# Patient Record
Sex: FEMALE | Race: WHITE | NOT HISPANIC OR LATINO | Employment: OTHER | URBAN - NONMETROPOLITAN AREA
[De-identification: names, ages, dates, MRNs, and addresses within clinical notes are randomized per-mention and may not be internally consistent; named-entity substitution may affect disease eponyms.]

---

## 2023-03-09 DIAGNOSIS — E87.6 HYPOKALEMIA: ICD-10-CM

## 2023-03-09 RX ORDER — METFORMIN HYDROCHLORIDE 500 MG/1
500 TABLET ORAL
COMMUNITY
End: 2023-03-24

## 2023-03-09 RX ORDER — GLIMEPIRIDE 2 MG/1
2 TABLET ORAL 2 TIMES DAILY
COMMUNITY
End: 2023-04-06

## 2023-03-09 RX ORDER — POTASSIUM CHLORIDE 20 MEQ/1
20 TABLET, EXTENDED RELEASE ORAL DAILY
COMMUNITY
End: 2023-03-22 | Stop reason: SDUPTHER

## 2023-03-09 RX ORDER — POTASSIUM CHLORIDE 20 MEQ/1
20 TABLET, EXTENDED RELEASE ORAL DAILY
Qty: 90 TABLET | Refills: 1 | Status: CANCELLED | OUTPATIENT
Start: 2023-03-09

## 2023-03-09 RX ORDER — AMLODIPINE BESYLATE 10 MG/1
5 TABLET ORAL DAILY
COMMUNITY
End: 2023-04-07

## 2023-03-09 RX ORDER — ENALAPRIL MALEATE 20 MG/1
20 TABLET ORAL DAILY
COMMUNITY
End: 2023-04-06

## 2023-03-09 RX ORDER — BISOPROLOL FUMARATE AND HYDROCHLOROTHIAZIDE 5; 6.25 MG/1; MG/1
1 TABLET ORAL DAILY
COMMUNITY
End: 2023-04-06

## 2023-03-09 RX ORDER — FLUTICASONE PROPIONATE 50 MCG
2 SPRAY, SUSPENSION (ML) NASAL DAILY
COMMUNITY
Start: 2019-07-02 | End: 2023-03-22

## 2023-03-18 DIAGNOSIS — E87.6 HYPOKALEMIA: Primary | ICD-10-CM

## 2023-03-18 PROBLEM — B00.89 HERPES SIMPLEX VIRUS (HSV) INFECTION OF BUTTOCK: Status: ACTIVE | Noted: 2023-03-18

## 2023-03-18 PROBLEM — N95.2 POSTMENOPAUSAL ATROPHIC VAGINITIS: Status: ACTIVE | Noted: 2023-03-18

## 2023-03-18 PROBLEM — E78.5 HYPERLIPIDEMIA: Status: ACTIVE | Noted: 2023-03-18

## 2023-03-18 PROBLEM — E66.09 CLASS 1 OBESITY DUE TO EXCESS CALORIES WITH SERIOUS COMORBIDITY AND BODY MASS INDEX (BMI) OF 30.0 TO 30.9 IN ADULT: Status: ACTIVE | Noted: 2023-03-18

## 2023-03-18 PROBLEM — I10 HYPERTENSION: Status: ACTIVE | Noted: 2023-03-18

## 2023-03-18 PROBLEM — R21 SKIN RASH: Status: ACTIVE | Noted: 2023-03-18

## 2023-03-18 PROBLEM — R32 URINARY INCONTINENCE IN FEMALE: Status: ACTIVE | Noted: 2023-03-18

## 2023-03-18 PROBLEM — E66.811 CLASS 1 OBESITY DUE TO EXCESS CALORIES WITH SERIOUS COMORBIDITY AND BODY MASS INDEX (BMI) OF 30.0 TO 30.9 IN ADULT: Status: ACTIVE | Noted: 2023-03-18

## 2023-03-18 PROBLEM — M85.89 OSTEOPENIA OF MULTIPLE SITES: Status: ACTIVE | Noted: 2023-03-18

## 2023-03-18 PROBLEM — N18.1 STAGE 1 CHRONIC KIDNEY DISEASE: Status: ACTIVE | Noted: 2023-03-18

## 2023-03-18 PROBLEM — I83.813 VARICOSE VEINS OF BOTH LOWER EXTREMITIES WITH PAIN: Status: ACTIVE | Noted: 2023-03-18

## 2023-03-18 PROBLEM — N99.3 PROLAPSE OF VAGINAL VAULT AFTER HYSTERECTOMY: Status: ACTIVE | Noted: 2023-03-18

## 2023-03-18 PROBLEM — R06.2 WHEEZING: Status: ACTIVE | Noted: 2023-03-18

## 2023-03-18 PROBLEM — J30.9 ALLERGIC RHINITIS DUE TO ALLERGEN: Status: ACTIVE | Noted: 2023-03-18

## 2023-03-18 PROBLEM — E66.09 CLASS 1 OBESITY DUE TO EXCESS CALORIES WITH SERIOUS COMORBIDITY AND BODY MASS INDEX (BMI) OF 31.0 TO 31.9 IN ADULT: Status: ACTIVE | Noted: 2023-03-18

## 2023-03-18 PROBLEM — E66.811 CLASS 1 OBESITY DUE TO EXCESS CALORIES WITH SERIOUS COMORBIDITY AND BODY MASS INDEX (BMI) OF 31.0 TO 31.9 IN ADULT: Status: ACTIVE | Noted: 2023-03-18

## 2023-03-18 PROBLEM — E11.9 TYPE 2 DIABETES MELLITUS WITHOUT COMPLICATION, WITHOUT LONG-TERM CURRENT USE OF INSULIN (MULTI): Status: ACTIVE | Noted: 2023-03-18

## 2023-03-18 PROBLEM — R53.82 CHRONIC FATIGUE: Status: ACTIVE | Noted: 2023-03-18

## 2023-03-18 RX ORDER — LANCETS 33 GAUGE
1 EACH MISCELLANEOUS 2 TIMES DAILY
COMMUNITY
Start: 2022-12-12 | End: 2023-11-22 | Stop reason: SDUPTHER

## 2023-03-18 RX ORDER — LATANOPROST 50 UG/ML
SOLUTION/ DROPS OPHTHALMIC
COMMUNITY
Start: 2013-04-02

## 2023-03-18 RX ORDER — DORZOLAMIDE HCL 20 MG/ML
SOLUTION/ DROPS OPHTHALMIC
COMMUNITY
Start: 2014-05-18 | End: 2024-02-26 | Stop reason: SDUPTHER

## 2023-03-18 RX ORDER — GARLIC 1000 MG
1 CAPSULE ORAL DAILY
COMMUNITY

## 2023-03-18 RX ORDER — BLOOD SUGAR DIAGNOSTIC
1 STRIP MISCELLANEOUS 3 TIMES DAILY
COMMUNITY
Start: 2013-04-02 | End: 2023-05-15

## 2023-03-18 RX ORDER — VALACYCLOVIR HYDROCHLORIDE 1 G/1
1000 TABLET, FILM COATED ORAL DAILY
COMMUNITY
Start: 2020-09-03 | End: 2023-11-29 | Stop reason: SDUPTHER

## 2023-03-18 RX ORDER — CAL/D3/MAG11/ZINC/COP/MANG/BOR 600 MG-800
1 TABLET ORAL DAILY
COMMUNITY
Start: 2020-07-10

## 2023-03-20 LAB
ALANINE AMINOTRANSFERASE (SGPT) (U/L) IN SER/PLAS: 20 U/L (ref 7–45)
ALBUMIN (G/DL) IN SER/PLAS: 4.4 G/DL (ref 3.4–5)
ALKALINE PHOSPHATASE (U/L) IN SER/PLAS: 34 U/L (ref 33–136)
ANION GAP IN SER/PLAS: 14 MMOL/L (ref 10–20)
APPEARANCE, URINE: ABNORMAL
ASPARTATE AMINOTRANSFERASE (SGOT) (U/L) IN SER/PLAS: 18 U/L (ref 9–39)
BACTERIA, URINE: ABNORMAL /HPF
BASOPHILS (10*3/UL) IN BLOOD BY AUTOMATED COUNT: 0.08 X10E9/L (ref 0–0.1)
BASOPHILS/100 LEUKOCYTES IN BLOOD BY AUTOMATED COUNT: 1 % (ref 0–2)
BILIRUBIN TOTAL (MG/DL) IN SER/PLAS: 0.4 MG/DL (ref 0–1.2)
BILIRUBIN, URINE: NEGATIVE
BLOOD, URINE: NEGATIVE
CALCIUM (MG/DL) IN SER/PLAS: 9.3 MG/DL (ref 8.6–10.3)
CARBON DIOXIDE, TOTAL (MMOL/L) IN SER/PLAS: 28 MMOL/L (ref 21–32)
CHLORIDE (MMOL/L) IN SER/PLAS: 104 MMOL/L (ref 98–107)
CHOLESTEROL (MG/DL) IN SER/PLAS: 189 MG/DL (ref 0–199)
CHOLESTEROL IN HDL (MG/DL) IN SER/PLAS: 60.7 MG/DL
CHOLESTEROL/HDL RATIO: 3.1
COLOR, URINE: YELLOW
CREATININE (MG/DL) IN SER/PLAS: 0.85 MG/DL (ref 0.5–1.05)
EOSINOPHILS (10*3/UL) IN BLOOD BY AUTOMATED COUNT: 0.83 X10E9/L (ref 0–0.4)
EOSINOPHILS/100 LEUKOCYTES IN BLOOD BY AUTOMATED COUNT: 9.9 % (ref 0–6)
ERYTHROCYTE DISTRIBUTION WIDTH (RATIO) BY AUTOMATED COUNT: 13.1 % (ref 11.5–14.5)
ERYTHROCYTE MEAN CORPUSCULAR HEMOGLOBIN CONCENTRATION (G/DL) BY AUTOMATED: 33.5 G/DL (ref 32–36)
ERYTHROCYTE MEAN CORPUSCULAR VOLUME (FL) BY AUTOMATED COUNT: 88 FL (ref 80–100)
ERYTHROCYTES (10*6/UL) IN BLOOD BY AUTOMATED COUNT: 4.91 X10E12/L (ref 4–5.2)
GFR FEMALE: 71 ML/MIN/1.73M2
GLUCOSE (MG/DL) IN SER/PLAS: 177 MG/DL (ref 74–99)
GLUCOSE, URINE: NEGATIVE MG/DL
HEMATOCRIT (%) IN BLOOD BY AUTOMATED COUNT: 43.3 % (ref 36–46)
HEMOGLOBIN (G/DL) IN BLOOD: 14.5 G/DL (ref 12–16)
IMMATURE GRANULOCYTES/100 LEUKOCYTES IN BLOOD BY AUTOMATED COUNT: 0.1 % (ref 0–0.9)
KETONES, URINE: NEGATIVE MG/DL
LDL: 101 MG/DL (ref 0–99)
LEUKOCYTE ESTERASE, URINE: ABNORMAL
LEUKOCYTES (10*3/UL) IN BLOOD BY AUTOMATED COUNT: 8.4 X10E9/L (ref 4.4–11.3)
LYMPHOCYTES (10*3/UL) IN BLOOD BY AUTOMATED COUNT: 3.89 X10E9/L (ref 0.8–3)
LYMPHOCYTES/100 LEUKOCYTES IN BLOOD BY AUTOMATED COUNT: 46.4 % (ref 13–44)
MONOCYTES (10*3/UL) IN BLOOD BY AUTOMATED COUNT: 0.46 X10E9/L (ref 0.05–0.8)
MONOCYTES/100 LEUKOCYTES IN BLOOD BY AUTOMATED COUNT: 5.5 % (ref 2–10)
MUCUS, URINE: ABNORMAL /LPF
NEUTROPHILS (10*3/UL) IN BLOOD BY AUTOMATED COUNT: 3.11 X10E9/L (ref 1.6–5.5)
NEUTROPHILS/100 LEUKOCYTES IN BLOOD BY AUTOMATED COUNT: 37.1 % (ref 40–80)
NITRITE, URINE: NEGATIVE
PH, URINE: 6 (ref 5–8)
PLATELETS (10*3/UL) IN BLOOD AUTOMATED COUNT: 375 X10E9/L (ref 150–450)
POTASSIUM (MMOL/L) IN SER/PLAS: 3.8 MMOL/L (ref 3.5–5.3)
PROTEIN TOTAL: 7.1 G/DL (ref 6.4–8.2)
PROTEIN, URINE: NEGATIVE MG/DL
RBC, URINE: 1 /HPF (ref 0–5)
SODIUM (MMOL/L) IN SER/PLAS: 142 MMOL/L (ref 136–145)
SPECIFIC GRAVITY, URINE: 1.02 (ref 1–1.03)
SQUAMOUS EPITHELIAL CELLS, URINE: 19 /HPF
THYROTROPIN (MIU/L) IN SER/PLAS BY DETECTION LIMIT <= 0.05 MIU/L: 3.96 MIU/L (ref 0.44–3.98)
TRIGLYCERIDE (MG/DL) IN SER/PLAS: 135 MG/DL (ref 0–149)
UREA NITROGEN (MG/DL) IN SER/PLAS: 11 MG/DL (ref 6–23)
UROBILINOGEN, URINE: <2 MG/DL (ref 0–1.9)
VLDL: 27 MG/DL (ref 0–40)
WBC, URINE: 10 /HPF (ref 0–5)

## 2023-03-21 LAB
ALBUMIN (MG/L) IN URINE: 14.3 MG/L
ALBUMIN/CREATININE (UG/MG) IN URINE: 14.3 UG/MG CRT (ref 0–30)
CALCIDIOL (25 OH VITAMIN D3) (NG/ML) IN SER/PLAS: 49 NG/ML
COBALAMIN (VITAMIN B12) (PG/ML) IN SER/PLAS: 551 PG/ML (ref 211–911)
CREATININE (MG/DL) IN URINE: 99.7 MG/DL (ref 20–320)
ESTIMATED AVERAGE GLUCOSE FOR HBA1C: 180 MG/DL
HEMOGLOBIN A1C/HEMOGLOBIN TOTAL IN BLOOD: 7.9 %

## 2023-03-22 ENCOUNTER — OFFICE VISIT (OUTPATIENT)
Dept: PRIMARY CARE | Facility: CLINIC | Age: 73
End: 2023-03-22
Payer: MEDICARE

## 2023-03-22 VITALS
HEART RATE: 76 BPM | OXYGEN SATURATION: 98 % | DIASTOLIC BLOOD PRESSURE: 84 MMHG | WEIGHT: 155.2 LBS | BODY MASS INDEX: 29.3 KG/M2 | SYSTOLIC BLOOD PRESSURE: 142 MMHG | HEIGHT: 61 IN | RESPIRATION RATE: 20 BRPM

## 2023-03-22 DIAGNOSIS — E87.6 HYPOKALEMIA: ICD-10-CM

## 2023-03-22 DIAGNOSIS — M54.2 NECK PAIN: ICD-10-CM

## 2023-03-22 DIAGNOSIS — I10 PRIMARY HYPERTENSION: ICD-10-CM

## 2023-03-22 DIAGNOSIS — J30.89 NON-SEASONAL ALLERGIC RHINITIS DUE TO OTHER ALLERGIC TRIGGER: ICD-10-CM

## 2023-03-22 DIAGNOSIS — E11.9 TYPE 2 DIABETES MELLITUS WITHOUT COMPLICATION, WITHOUT LONG-TERM CURRENT USE OF INSULIN (MULTI): ICD-10-CM

## 2023-03-22 DIAGNOSIS — M85.89 OSTEOPENIA OF MULTIPLE SITES: ICD-10-CM

## 2023-03-22 DIAGNOSIS — Z12.31 ENCOUNTER FOR SCREENING MAMMOGRAM FOR MALIGNANT NEOPLASM OF BREAST: ICD-10-CM

## 2023-03-22 DIAGNOSIS — E78.2 MIXED HYPERLIPIDEMIA: Primary | ICD-10-CM

## 2023-03-22 PROCEDURE — 4010F ACE/ARB THERAPY RXD/TAKEN: CPT | Performed by: FAMILY MEDICINE

## 2023-03-22 PROCEDURE — 3008F BODY MASS INDEX DOCD: CPT | Performed by: FAMILY MEDICINE

## 2023-03-22 PROCEDURE — 3051F HG A1C>EQUAL 7.0%<8.0%: CPT | Performed by: FAMILY MEDICINE

## 2023-03-22 PROCEDURE — 1036F TOBACCO NON-USER: CPT | Performed by: FAMILY MEDICINE

## 2023-03-22 PROCEDURE — 3077F SYST BP >= 140 MM HG: CPT | Performed by: FAMILY MEDICINE

## 2023-03-22 PROCEDURE — 1159F MED LIST DOCD IN RCRD: CPT | Performed by: FAMILY MEDICINE

## 2023-03-22 PROCEDURE — 3079F DIAST BP 80-89 MM HG: CPT | Performed by: FAMILY MEDICINE

## 2023-03-22 PROCEDURE — 1160F RVW MEDS BY RX/DR IN RCRD: CPT | Performed by: FAMILY MEDICINE

## 2023-03-22 PROCEDURE — 99214 OFFICE O/P EST MOD 30 MIN: CPT | Performed by: FAMILY MEDICINE

## 2023-03-22 RX ORDER — POTASSIUM CHLORIDE 20 MEQ/1
20 TABLET, EXTENDED RELEASE ORAL DAILY
Qty: 90 TABLET | Refills: 1 | Status: SHIPPED | OUTPATIENT
Start: 2023-03-22 | End: 2023-03-23

## 2023-03-22 RX ORDER — FLUTICASONE PROPIONATE 50 MCG
1 SPRAY, SUSPENSION (ML) NASAL DAILY
Qty: 16 G | Refills: 11 | Status: SHIPPED | OUTPATIENT
Start: 2023-03-22 | End: 2024-05-06

## 2023-03-22 ASSESSMENT — PAIN SCALES - GENERAL: PAINLEVEL: 0-NO PAIN

## 2023-03-22 NOTE — PROGRESS NOTES
Subjective     Lynn Foote is a 72 y.o. female who presents for No chief complaint on file..      HPI  The patient is a 72 year-old female presenting to the clinic for medication refills and to follow up on lab results. I have reviewed results from her most recent blood work with her.   A1c improving 7.9.  Follow-up.  Reviewed lab results.  Educated on dyslipidemia and diet and exercise.  Educated on osteopenia and muscle strength and exercise.  Recommended calcium and vitamin D.  Recommended muscle strength and exercise.  Educated on hypokalemia and advised to increase dietary potassium intake.  Educated on hypertension low-salt and exercise.  Educated on type 2 diabetes and diet exercise.  Uncontrolled.  Goes to ophthalmologist.  Educated on diabetic foot care.  Educated on allergic rhinitis.  Complaining pain in the neck.  On pain scale 7-8/10.  Squeezing throbbing pain.  Denies trauma.  Denies injury.  Over-the-counter medications not helping.                      Review of Systems  Review of systems    General.  Denies fever.  Denies chills.    HEENT complaining of congestion and sneezing and clearing throat respiratory.  Denies cough.  Denies shortness of breath.    Cardiovascular.  Denies chest pain.  Denies heart palpitations.  Denies shortness of breath.    Gastrointestinal.  Denies nausea vomiting diarrhea.  Denies abdominal pain.    Genitourinary denies burning urination.  Denies frequent urination.  Denies flank pain.  Denies blood in the urine.  Denies abnormal vaginal discharge.    Neurology.  Denies tingling numbness but denies weakness.  Denies headache.  Denies blurred vision.  Neck.  Dictated as above  Musculoskeletal.  Denies body aches.  Denies joint pains.  Denies muscle aches.  Denies muscle weakness    Endocrinology.  Denies cold intolerance.  Denies hot intolerance.    Psychiatric.  Denies depression.  Denies anxiety.  Denies suicidal.  Denies homicidal.      Objective   /84 (BP  "Location: Left arm, Patient Position: Sitting, BP Cuff Size: Large adult)   Pulse 76   Resp 20   Ht 1.549 m (5' 1\")   Wt 70.4 kg (155 lb 3.2 oz)   SpO2 98%   BMI 29.32 kg/m²        Physical Exam  General.  Not in distress.  HEENT normocephalic anicteric sclerae.  There mucosa is moist and congested and postnasal drip noted neck soft supple no thyromegaly.  Tenderness over the lower cervical paraspinal muscular area.        No carotid bruit.  Lungs are clear.  Heart regular.  Abdomen soft nontender nondistended bowel sounds are positive.  Extremities no clubbing cyanosis or edema.  Psychiatric.  Has good eye contact.  No crying spells noted.  Speech was normal.  Denies depression.  Denies suicidal.  Denies homicidal.          Foot exam.  Bilateral foot exam.  No rashes noted.  No lesions noted.  No ulcers noted.  No onychomycosis noted.  Sensation was intact with a monofilament.  Bilateral posterior tibial and dorsalis pedis arteries are palpable   Component      Latest Ref Rn 3/20/2023   WBC      4.4 - 11.3 x10E9/L 8.4    RBC      4.00 - 5.20 x10E12/L 4.91    HEMOGLOBIN      12.0 - 16.0 g/dL 14.5    HEMATOCRIT      36.0 - 46.0 % 43.3    MCV      80 - 100 fL 88    MCHC      32.0 - 36.0 g/dL 33.5    Platelets      150 - 450 x10E9/L 375    RED CELL DISTRIBUTION WIDTH      11.5 - 14.5 % 13.1    Neutrophils %      40.0 - 80.0 % 37.1    Immature Granulocytes %, Automated      0.0 - 0.9 % 0.1    Lymphocytes %      13.0 - 44.0 % 46.4    Monocytes %      2.0 - 10.0 % 5.5    Eosinophils %      0.0 - 6.0 % 9.9    Basophils %      0.0 - 2.0 % 1.0    Neutrophils Absolute      1.60 - 5.50 x10E9/L 3.11    Lymphocytes Absolute      0.80 - 3.00 x10E9/L 3.89 (H)    Monocytes Absolute      0.05 - 0.80 x10E9/L 0.46    Eosinophils Absolute      0.00 - 0.40 x10E9/L 0.83 (H)    Basophils Absolute      0.00 - 0.10 x10E9/L 0.08    GLUCOSE      74 - 99 mg/dL 177 (H)    SODIUM      136 - 145 mmol/L 142    POTASSIUM      3.5 - 5.3 " mmol/L 3.8    CHLORIDE      98 - 107 mmol/L 104    Bicarbonate      21 - 32 mmol/L 28    Anion Gap      10 - 20 mmol/L 14    Blood Urea Nitrogen      6 - 23 mg/dL 11    Creatinine      0.50 - 1.05 mg/dL 0.85    GFR Female      >90 mL/min/1.73m2 71    Calcium      8.6 - 10.3 mg/dL 9.3    Albumin      3.4 - 5.0 g/dL 4.4    Alkaline Phosphatase      33 - 136 U/L 34    Total Protein      6.4 - 8.2 g/dL 7.1    AST      9 - 39 U/L 18    Bilirubin Total      0.0 - 1.2 mg/dL 0.4    ALT      7 - 45 U/L 20    Color, Urine      STRAW,YELLOW  YELLOW    Appearance, Urine      CLEAR  SLIGHTLY CLOUDY    Specific Gravity, Urine      1.005 - 1.035  1.020    pH, Urine      5.0 - 8.0  6.0    Protein, Urine      NEGATIVE mg/dL NEGATIVE    Glucose, Urine      NEGATIVE mg/dL NEGATIVE    Blood, Urine      NEGATIVE  NEGATIVE    Ketones, Urine      NEGATIVE mg/dL NEGATIVE    Bilirubin, Urine      NEGATIVE  NEGATIVE    Urobilinogen, Urine      0.0 - 1.9 mg/dL <2.0    Nitrite, Urine      NEGATIVE  NEGATIVE    Leukocyte Esterase, Urine      NEGATIVE  SMALL (1+) !    CHOLESTEROL      0 - 199 mg/dL 189    HDL CHOLESTEROL      mg/dL 60.7    Cholesterol/HDL Ratio 3.1    LDL      0 - 99 mg/dL 101 (H)    VLDL      0 - 40 mg/dL 27    TRIGLYCERIDES      0 - 149 mg/dL 135    WBC, Urine      0 - 5 /HPF 10 !    RBC, Urine      0 - 5 /HPF 1    Squamous Epithelial Cells, Urine      /HPF 19    Bacteria, Urine      /HPF 3+ !    Mucus, Urine      /LPF FEW    ALBUMIN (MG/L) IN URINE      Not Established mg/L 14.3    Albumin/Creatine Ratio      0.0 - 30.0 ug/mg crt 14.3    Creatinine, Urine Random      20.0 - 320.0 mg/dL 99.7    Hemoglobin A1C      % 7.9 !    Estimated Average Glucose      MG/    Vitamin B12      211 - 911 pg/mL 551    Vitamin D, 25-Hydroxy, Total      ng/mL 49    Thyroid Stimulating Hormone      0.44 - 3.98 mIU/L 3.96       Legend:  (H) High  ! Abnormal  Assessment/Plan     1.  Hyperlipidemia.  Educated on diet exercise.  We will  continue monitor.    2.  Overweight.  Dictated as above  3.  Osteopenia.  Dictated as above.    4.  Hypokalemia.   Dictated as above    5.   Hypertension.  Dictated as above.    6.  Type 2 diabetes.  Uncontrolled.  Advised to check blood sugars at least twice daily.  Recommend eye exam once a year.  Educated on diabetic foot care.  7.  Allergic rhinitis.  Started on medication.    8.  Neck pain.  Educated on neck exercises.  We will follow-up on x-ray.                          Problem List Items Addressed This Visit          Circulatory    Hypertension       Musculoskeletal    Osteopenia of multiple sites    Relevant Orders    XR DEXA bone density       Endocrine/Metabolic    Type 2 diabetes mellitus without complication, without long-term current use of insulin (CMS/[facility])       Other    Hyperlipidemia - Primary    Hypokalemia    Relevant Medications    potassium chloride CR (Klor-Con M20) 20 mEq ER tablet    Allergic rhinitis due to allergen    Relevant Medications    fluticasone (Flonase) 50 mcg/actuation nasal spray     Other Visit Diagnoses       BMI 29.0-29.9,adult        Encounter for screening mammogram for malignant neoplasm of breast        Relevant Orders    BI mammo bilateral screening tomosynthesis    Neck pain        Relevant Orders    XR cervical spine complete 4-5 views            Scribe Attestation  By signing my name below, I, Gilda Ortiz Scrnegrito   attest that this documentation has been prepared under the direction and in the presence of Abdulaziz Sorto MD.

## 2023-03-23 RX ORDER — POTASSIUM CHLORIDE 20 MEQ/1
TABLET, EXTENDED RELEASE ORAL
Qty: 90 TABLET | Refills: 0 | Status: SHIPPED | OUTPATIENT
Start: 2023-03-23 | End: 2023-11-22 | Stop reason: SDUPTHER

## 2023-03-24 DIAGNOSIS — E11.9 TYPE 2 DIABETES MELLITUS WITHOUT COMPLICATIONS (MULTI): ICD-10-CM

## 2023-03-24 RX ORDER — METFORMIN HYDROCHLORIDE 500 MG/1
TABLET ORAL
Qty: 360 TABLET | Refills: 0 | Status: SHIPPED | OUTPATIENT
Start: 2023-03-24 | End: 2023-06-20

## 2023-06-20 DIAGNOSIS — I10 ESSENTIAL (PRIMARY) HYPERTENSION: ICD-10-CM

## 2023-06-20 DIAGNOSIS — E11.9 TYPE 2 DIABETES MELLITUS WITHOUT COMPLICATIONS (MULTI): ICD-10-CM

## 2023-06-20 RX ORDER — METFORMIN HYDROCHLORIDE 500 MG/1
TABLET ORAL
Qty: 360 TABLET | Refills: 0 | Status: SHIPPED | OUTPATIENT
Start: 2023-06-20 | End: 2023-11-22 | Stop reason: SDUPTHER

## 2023-06-20 RX ORDER — ENALAPRIL MALEATE 20 MG/1
TABLET ORAL
Qty: 90 TABLET | Refills: 0 | Status: SHIPPED | OUTPATIENT
Start: 2023-06-20 | End: 2023-11-22 | Stop reason: SDUPTHER

## 2023-06-20 RX ORDER — GLIMEPIRIDE 2 MG/1
TABLET ORAL
Qty: 180 TABLET | Refills: 0 | Status: SHIPPED | OUTPATIENT
Start: 2023-06-20 | End: 2023-11-22 | Stop reason: SDUPTHER

## 2023-06-20 RX ORDER — BISOPROLOL FUMARATE AND HYDROCHLOROTHIAZIDE 5; 6.25 MG/1; MG/1
TABLET ORAL
Qty: 90 TABLET | Refills: 0 | Status: SHIPPED | OUTPATIENT
Start: 2023-06-20 | End: 2023-11-22 | Stop reason: SDUPTHER

## 2023-08-14 ENCOUNTER — TELEPHONE (OUTPATIENT)
Dept: PRIMARY CARE | Facility: CLINIC | Age: 73
End: 2023-08-14
Payer: MEDICARE

## 2023-11-22 DIAGNOSIS — E11.9 TYPE 2 DIABETES MELLITUS WITHOUT COMPLICATIONS (MULTI): ICD-10-CM

## 2023-11-22 DIAGNOSIS — I10 ESSENTIAL (PRIMARY) HYPERTENSION: ICD-10-CM

## 2023-11-22 DIAGNOSIS — E87.6 HYPOKALEMIA: ICD-10-CM

## 2023-11-22 RX ORDER — ENALAPRIL MALEATE 20 MG/1
20 TABLET ORAL DAILY
Qty: 90 TABLET | Refills: 0 | Status: SHIPPED | OUTPATIENT
Start: 2023-11-22 | End: 2024-02-26 | Stop reason: SDUPTHER

## 2023-11-22 RX ORDER — LANCETS 33 GAUGE
1 EACH MISCELLANEOUS 2 TIMES DAILY
Qty: 100 EACH | Refills: 2 | Status: SHIPPED | OUTPATIENT
Start: 2023-11-22 | End: 2024-01-29

## 2023-11-22 RX ORDER — BISOPROLOL FUMARATE AND HYDROCHLOROTHIAZIDE 5; 6.25 MG/1; MG/1
1 TABLET ORAL DAILY
Qty: 90 TABLET | Refills: 0 | Status: SHIPPED | OUTPATIENT
Start: 2023-11-22 | End: 2024-02-26 | Stop reason: SDUPTHER

## 2023-11-22 RX ORDER — BLOOD SUGAR DIAGNOSTIC
STRIP MISCELLANEOUS
Qty: 200 STRIP | Refills: 2 | Status: SHIPPED | OUTPATIENT
Start: 2023-11-22 | End: 2024-01-29

## 2023-11-22 RX ORDER — AMLODIPINE BESYLATE 10 MG/1
5 TABLET ORAL DAILY
Qty: 45 TABLET | Refills: 0 | Status: SHIPPED | OUTPATIENT
Start: 2023-11-22 | End: 2024-02-26 | Stop reason: SDUPTHER

## 2023-11-22 RX ORDER — METFORMIN HYDROCHLORIDE 500 MG/1
TABLET ORAL
Qty: 360 TABLET | Refills: 0 | Status: SHIPPED | OUTPATIENT
Start: 2023-11-22 | End: 2024-02-05

## 2023-11-22 RX ORDER — GLIMEPIRIDE 2 MG/1
2 TABLET ORAL 2 TIMES DAILY
Qty: 180 TABLET | Refills: 0 | Status: SHIPPED | OUTPATIENT
Start: 2023-11-22 | End: 2024-02-26 | Stop reason: SDUPTHER

## 2023-11-22 RX ORDER — POTASSIUM CHLORIDE 20 MEQ/1
20 TABLET, EXTENDED RELEASE ORAL DAILY
Qty: 90 TABLET | Refills: 0 | Status: SHIPPED | OUTPATIENT
Start: 2023-11-22 | End: 2024-02-26 | Stop reason: SDUPTHER

## 2023-11-29 DIAGNOSIS — B00.89 HERPES SIMPLEX VIRUS (HSV) INFECTION OF BUTTOCK: ICD-10-CM

## 2023-11-29 NOTE — TELEPHONE ENCOUNTER
Refill request for Valacyclovir 1 gram tablet. Patient last seen on 3/22 and does not have an appointment until 2/26/23 to establish care.

## 2023-11-30 RX ORDER — VALACYCLOVIR HYDROCHLORIDE 1 G/1
1000 TABLET, FILM COATED ORAL DAILY
Qty: 7 TABLET | Refills: 0 | Status: SHIPPED | OUTPATIENT
Start: 2023-11-30 | End: 2023-12-07

## 2023-12-01 DIAGNOSIS — E11.9 TYPE 2 DIABETES MELLITUS WITHOUT COMPLICATIONS (MULTI): ICD-10-CM

## 2023-12-01 DIAGNOSIS — I10 ESSENTIAL (PRIMARY) HYPERTENSION: ICD-10-CM

## 2023-12-01 DIAGNOSIS — E87.6 HYPOKALEMIA: ICD-10-CM

## 2023-12-04 RX ORDER — AMLODIPINE BESYLATE 10 MG/1
5 TABLET ORAL DAILY
Qty: 45 TABLET | Refills: 0 | OUTPATIENT
Start: 2023-12-04

## 2023-12-04 RX ORDER — GLIMEPIRIDE 2 MG/1
2 TABLET ORAL 2 TIMES DAILY
Qty: 180 TABLET | Refills: 0 | OUTPATIENT
Start: 2023-12-04

## 2023-12-04 RX ORDER — BISOPROLOL FUMARATE AND HYDROCHLOROTHIAZIDE 5; 6.25 MG/1; MG/1
1 TABLET ORAL DAILY
Qty: 90 TABLET | Refills: 0 | OUTPATIENT
Start: 2023-12-04

## 2023-12-04 RX ORDER — POTASSIUM CHLORIDE 20 MEQ/1
20 TABLET, EXTENDED RELEASE ORAL DAILY
Qty: 90 TABLET | Refills: 0 | OUTPATIENT
Start: 2023-12-04

## 2023-12-04 RX ORDER — METFORMIN HYDROCHLORIDE 500 MG/1
TABLET ORAL
Qty: 360 TABLET | Refills: 0 | OUTPATIENT
Start: 2023-12-04

## 2023-12-04 RX ORDER — ENALAPRIL MALEATE 20 MG/1
20 TABLET ORAL DAILY
Qty: 90 TABLET | Refills: 0 | OUTPATIENT
Start: 2023-12-04

## 2023-12-04 NOTE — TELEPHONE ENCOUNTER
Patient requesting refill for multiple medications. Last visit was on 7/28/23, labs on 3/23 and apointment is scheduled for 2/26/24

## 2024-01-27 DIAGNOSIS — E11.9 TYPE 2 DIABETES MELLITUS WITHOUT COMPLICATIONS (MULTI): ICD-10-CM

## 2024-01-29 RX ORDER — BLOOD SUGAR DIAGNOSTIC
STRIP MISCELLANEOUS
Qty: 300 STRIP | Refills: 2 | Status: SHIPPED | OUTPATIENT
Start: 2024-01-29 | End: 2024-02-27 | Stop reason: WASHOUT

## 2024-01-29 RX ORDER — LANCETS
EACH MISCELLANEOUS
Qty: 300 EACH | Refills: 2 | Status: SHIPPED | OUTPATIENT
Start: 2024-01-29

## 2024-02-03 DIAGNOSIS — E11.9 TYPE 2 DIABETES MELLITUS WITHOUT COMPLICATIONS (MULTI): ICD-10-CM

## 2024-02-05 RX ORDER — METFORMIN HYDROCHLORIDE 500 MG/1
TABLET ORAL
Qty: 360 TABLET | Refills: 0 | Status: SHIPPED | OUTPATIENT
Start: 2024-02-05 | End: 2024-02-26 | Stop reason: SDUPTHER

## 2024-02-26 ENCOUNTER — OFFICE VISIT (OUTPATIENT)
Dept: PRIMARY CARE | Facility: CLINIC | Age: 74
End: 2024-02-26
Payer: MEDICARE

## 2024-02-26 VITALS
SYSTOLIC BLOOD PRESSURE: 152 MMHG | DIASTOLIC BLOOD PRESSURE: 81 MMHG | WEIGHT: 152.6 LBS | HEIGHT: 61 IN | BODY MASS INDEX: 28.81 KG/M2 | OXYGEN SATURATION: 95 % | HEART RATE: 79 BPM | RESPIRATION RATE: 16 BRPM

## 2024-02-26 DIAGNOSIS — E78.2 MIXED HYPERLIPIDEMIA: ICD-10-CM

## 2024-02-26 DIAGNOSIS — R32 URINARY INCONTINENCE IN FEMALE: ICD-10-CM

## 2024-02-26 DIAGNOSIS — Z79.4 TYPE 2 DIABETES MELLITUS WITH BOTH EYES AFFECTED BY MILD NONPROLIFERATIVE RETINOPATHY WITHOUT MACULAR EDEMA, WITH LONG-TERM CURRENT USE OF INSULIN (MULTI): ICD-10-CM

## 2024-02-26 DIAGNOSIS — I10 ESSENTIAL (PRIMARY) HYPERTENSION: ICD-10-CM

## 2024-02-26 DIAGNOSIS — Z12.11 COLON CANCER SCREENING: ICD-10-CM

## 2024-02-26 DIAGNOSIS — E11.9 TYPE 2 DIABETES MELLITUS WITHOUT COMPLICATION, WITHOUT LONG-TERM CURRENT USE OF INSULIN (MULTI): ICD-10-CM

## 2024-02-26 DIAGNOSIS — E11.9 TYPE 2 DIABETES MELLITUS WITHOUT COMPLICATIONS (MULTI): ICD-10-CM

## 2024-02-26 DIAGNOSIS — H40.9 GLAUCOMA, UNSPECIFIED GLAUCOMA TYPE, UNSPECIFIED LATERALITY: Primary | ICD-10-CM

## 2024-02-26 DIAGNOSIS — M85.89 OSTEOPENIA OF MULTIPLE SITES: ICD-10-CM

## 2024-02-26 DIAGNOSIS — I10 PRIMARY HYPERTENSION: ICD-10-CM

## 2024-02-26 DIAGNOSIS — E87.6 HYPOKALEMIA: ICD-10-CM

## 2024-02-26 DIAGNOSIS — E11.3293 TYPE 2 DIABETES MELLITUS WITH BOTH EYES AFFECTED BY MILD NONPROLIFERATIVE RETINOPATHY WITHOUT MACULAR EDEMA, WITH LONG-TERM CURRENT USE OF INSULIN (MULTI): ICD-10-CM

## 2024-02-26 DIAGNOSIS — Z13.220 LIPID SCREENING: ICD-10-CM

## 2024-02-26 LAB — POC HEMOGLOBIN A1C: 8.4 % (ref 4.2–6.5)

## 2024-02-26 PROCEDURE — 4010F ACE/ARB THERAPY RXD/TAKEN: CPT | Performed by: INTERNAL MEDICINE

## 2024-02-26 PROCEDURE — 3079F DIAST BP 80-89 MM HG: CPT | Performed by: INTERNAL MEDICINE

## 2024-02-26 PROCEDURE — 1126F AMNT PAIN NOTED NONE PRSNT: CPT | Performed by: INTERNAL MEDICINE

## 2024-02-26 PROCEDURE — 83036 HEMOGLOBIN GLYCOSYLATED A1C: CPT | Performed by: INTERNAL MEDICINE

## 2024-02-26 PROCEDURE — 1160F RVW MEDS BY RX/DR IN RCRD: CPT | Performed by: INTERNAL MEDICINE

## 2024-02-26 PROCEDURE — 99214 OFFICE O/P EST MOD 30 MIN: CPT | Performed by: INTERNAL MEDICINE

## 2024-02-26 PROCEDURE — 3077F SYST BP >= 140 MM HG: CPT | Performed by: INTERNAL MEDICINE

## 2024-02-26 PROCEDURE — 1159F MED LIST DOCD IN RCRD: CPT | Performed by: INTERNAL MEDICINE

## 2024-02-26 PROCEDURE — 1036F TOBACCO NON-USER: CPT | Performed by: INTERNAL MEDICINE

## 2024-02-26 RX ORDER — BLOOD-GLUCOSE METER
1 EACH MISCELLANEOUS
COMMUNITY
Start: 2024-02-14

## 2024-02-26 RX ORDER — CALCIUM CITRATE/VITAMIN D3 200MG-6.25
1 TABLET ORAL 2 TIMES DAILY
Qty: 100 EACH | Refills: 1 | Status: SHIPPED | OUTPATIENT
Start: 2024-02-26 | End: 2024-06-03

## 2024-02-26 RX ORDER — AMLODIPINE BESYLATE 5 MG/1
5 TABLET ORAL DAILY
Qty: 90 TABLET | Refills: 1 | Status: SHIPPED | OUTPATIENT
Start: 2024-02-26 | End: 2024-06-03

## 2024-02-26 RX ORDER — BISOPROLOL FUMARATE AND HYDROCHLOROTHIAZIDE 5; 6.25 MG/1; MG/1
1 TABLET ORAL DAILY
Qty: 90 TABLET | Refills: 0 | Status: SHIPPED | OUTPATIENT
Start: 2024-02-26 | End: 2024-02-29

## 2024-02-26 RX ORDER — METFORMIN HYDROCHLORIDE 1000 MG/1
TABLET ORAL
Qty: 180 TABLET | Refills: 0 | Status: SHIPPED | OUTPATIENT
Start: 2024-02-26 | End: 2024-02-29

## 2024-02-26 RX ORDER — VALACYCLOVIR HYDROCHLORIDE 1 G/1
1000 TABLET, FILM COATED ORAL DAILY
COMMUNITY
End: 2024-06-11 | Stop reason: SDUPTHER

## 2024-02-26 RX ORDER — DORZOLAMIDE HCL 20 MG/ML
1 SOLUTION/ DROPS OPHTHALMIC 3 TIMES DAILY
Qty: 10 ML | Refills: 1 | Status: SHIPPED | OUTPATIENT
Start: 2024-02-26

## 2024-02-26 RX ORDER — BLOOD-GLUCOSE CONTROL, NORMAL
1 EACH MISCELLANEOUS 2 TIMES DAILY
Qty: 100 EACH | Refills: 1 | Status: SHIPPED | OUTPATIENT
Start: 2024-02-26

## 2024-02-26 RX ORDER — ENALAPRIL MALEATE 20 MG/1
20 TABLET ORAL DAILY
Qty: 90 TABLET | Refills: 0 | Status: SHIPPED | OUTPATIENT
Start: 2024-02-26 | End: 2024-02-29

## 2024-02-26 RX ORDER — GLIMEPIRIDE 2 MG/1
2 TABLET ORAL 2 TIMES DAILY
Qty: 180 TABLET | Refills: 0 | Status: SHIPPED | OUTPATIENT
Start: 2024-02-26 | End: 2024-02-29

## 2024-02-26 RX ORDER — POTASSIUM CHLORIDE 20 MEQ/1
20 TABLET, EXTENDED RELEASE ORAL DAILY
Qty: 90 TABLET | Refills: 0 | Status: SHIPPED | OUTPATIENT
Start: 2024-02-26 | End: 2024-02-29

## 2024-02-26 ASSESSMENT — ENCOUNTER SYMPTOMS
CARDIOVASCULAR NEGATIVE: 1
DIARRHEA: 1
RESPIRATORY NEGATIVE: 1
NEUROLOGICAL NEGATIVE: 1
MUSCULOSKELETAL NEGATIVE: 1
PSYCHIATRIC NEGATIVE: 1
CONSTITUTIONAL NEGATIVE: 1

## 2024-02-26 ASSESSMENT — PATIENT HEALTH QUESTIONNAIRE - PHQ9
SUM OF ALL RESPONSES TO PHQ9 QUESTIONS 1 AND 2: 0
1. LITTLE INTEREST OR PLEASURE IN DOING THINGS: NOT AT ALL
2. FEELING DOWN, DEPRESSED OR HOPELESS: NOT AT ALL

## 2024-02-26 ASSESSMENT — COLUMBIA-SUICIDE SEVERITY RATING SCALE - C-SSRS
2. HAVE YOU ACTUALLY HAD ANY THOUGHTS OF KILLING YOURSELF?: NO
6. HAVE YOU EVER DONE ANYTHING, STARTED TO DO ANYTHING, OR PREPARED TO DO ANYTHING TO END YOUR LIFE?: NO
1. IN THE PAST MONTH, HAVE YOU WISHED YOU WERE DEAD OR WISHED YOU COULD GO TO SLEEP AND NOT WAKE UP?: NO

## 2024-02-26 ASSESSMENT — PAIN SCALES - GENERAL: PAINLEVEL: 0-NO PAIN

## 2024-02-26 NOTE — PROGRESS NOTES
Patient ID: She states that she is scheduled to see Dr. Manuel regarding lifting her bladder. She states she checks her sugars every day, was 180 this am. She states she has not been very active because she is afraid of her bladder being low. She states she has diarrhea from Metformin. She denies any SOB, chest pain or cough. She     HPI Lynn Foote is a 73 y.o. female with PMH remarkable for HLD, Osteopenia, HTN, Type 2 DM,  who presents to the office today for Establish Care.    HEALTH MAINTENANCE: Establish Care  Previous PCP: Dr. Sorto  Smoking: never smoker  Mammogram (40-75):  7/13/23 normal  Pap smear (21-65): n/a age  Labs: 3/20/23  Colonoscopy (45-75): 7/21/16,no polyps  Lung cancer screening (55-80 + 30 pack year + smoking/quit in last 15 years): n/a  DEXA (65+, q 2 years): 7/31/23 WNL    SOCIAL HISTORY:  Social History     Tobacco Use    Smoking status: Never    Smokeless tobacco: Never   Vaping Use    Vaping Use: Never used   Substance Use Topics    Alcohol use: Never    Drug use: Never       IMMUNIZATIONS:  Immunization History   Administered Date(s) Administered    Influenza, High Dose Seasonal, Preservative Free 10/04/2019    Pneumococcal conjugate vaccine, 13-valent (PREVNAR 13) 01/05/2016    Pneumococcal polysaccharide vaccine, 23-valent, age 2 years and older (PNEUMOVAX 23) 03/02/2017     REVIEW OF SYSTEMS:  Review of Systems   Constitutional: Negative.    Respiratory: Negative.     Cardiovascular: Negative.    Gastrointestinal:  Positive for diarrhea.   Genitourinary: Negative.    Musculoskeletal: Negative.    Neurological: Negative.    Psychiatric/Behavioral: Negative.       ALLERGIES:  Allergies   Allergen Reactions    Penicillins Other     Unable to talk for a week      VITAL SIGNS:  Vitals:    02/26/24 1307   BP: 152/81   Pulse: 79   Resp: 16   SpO2: 95%     Physical Exam  Vitals reviewed.   Constitutional:       Appearance: Normal appearance.   HENT:      Head: Normocephalic and  atraumatic.   Cardiovascular:      Rate and Rhythm: Normal rate and regular rhythm.      Pulses: Normal pulses.      Heart sounds: Normal heart sounds.   Pulmonary:      Effort: Pulmonary effort is normal.      Breath sounds: Normal breath sounds.   Abdominal:      General: Bowel sounds are normal.      Palpations: Abdomen is soft.   Musculoskeletal:         General: Normal range of motion.   Neurological:      General: No focal deficit present.      Mental Status: She is alert and oriented to person, place, and time.   Psychiatric:         Mood and Affect: Mood normal.         Behavior: Behavior normal.     MEDICATIONS:  Current Outpatient Medications on File Prior to Visit   Medication Sig Dispense Refill    amLODIPine (Norvasc) 10 mg tablet Take 0.5 tablets (5 mg) by mouth once daily. 45 tablet 0    bisoproloL-hydrochlorothiazide (Ziac) 5-6.25 mg tablet Take 1 tablet by mouth once daily. as directed 90 tablet 0    fnr-[id number]-[id number]-zinc--chidi-bor (Caltrate 600-D Plus Minerals) 600 mg calcium- 800 unit-50 mg tablet Take 1 tablet by mouth early in the morning..      docosahexaenoic acid/epa (FISH OIL ORAL) Take by mouth.      dorzolamide (Trusopt) 2 % ophthalmic solution Administer into affected eye(s).      enalapril (Vasotec) 20 mg tablet Take 1 tablet (20 mg) by mouth once daily. as directed 90 tablet 0    fluticasone (Flonase) 50 mcg/actuation nasal spray Administer 1 spray into each nostril once daily. Shake gently. Before first use, prime pump. After use, clean tip and replace cap. 16 g 11    garlic 1,000 mg capsule Take 1 capsule by mouth once daily.      glimepiride (Amaryl) 2 mg tablet Take 1 tablet (2 mg) by mouth 2 times a day. 180 tablet 0    latanoprost (Xalatan) 0.005 % ophthalmic solution Administer into affected eye(s).      metFORMIN (Glucophage) 500 mg tablet TAKE 2 (TWO) TABLETS BY MOUTH TWICE DAILY WITH MEALS PLEASE FOLLOW UP WITH NEW PROVIDER FOR REFILLS 360 tablet 0    multivit-min/folic  acid/vit K1 (MULTI FOR HER 50 PLUS ORAL) Take by mouth.      OneTouch Ultra Test strip TEST 3 TIMES DAILY 300 strip 2    potassium chloride CR 20 mEq ER tablet Take 1 tablet (20 mEq) by mouth once daily. Do not crush or chew. 90 tablet 0    Unilet Super Thin Lancets 30 gauge misc use THREE TIMES DAILY 300 each 2     No current facility-administered medications on file prior to visit.      LABORATORY DATA:  Lab Results   Component Value Date    WBC 8.4 03/20/2023    HGB 14.5 03/20/2023    HCT 43.3 03/20/2023     03/20/2023    CHOL 189 03/20/2023    TRIG 135 03/20/2023    HDL 60.7 03/20/2023    ALT 20 03/20/2023    AST 18 03/20/2023     03/20/2023    K 3.8 03/20/2023     03/20/2023    CREATININE 0.85 03/20/2023    BUN 11 03/20/2023    CO2 28 03/20/2023    TSH 3.96 03/20/2023    HGBA1C 7.9 (A) 03/20/2023     ASSESSMENT AND PLAN:  Assessment/Plan   Diagnoses and all orders for this visit:  Glaucoma, unspecified glaucoma type, unspecified laterality  -     dorzolamide (Trusopt) 2 % ophthalmic solution; Administer 1 drop into both eyes 3 times a day.    Type 2 diabetes mellitus without complication, without long-term current use of insulin (CMS/[facility])  -     POCT glycosylated hemoglobin (Hb A1C) manually resulted with result of 8.4 which is more elevated than previous, was 7.9 in March  -     discussed adding weekly medication(injectable) to help with getting sugars down and weight loss, and she declines at this time  -     she would like to cut out sweets from her diet, improve diet and repeat HgbA1c in three months, if no improvement, will adjust medications at that time  -     continue with glimepiride (Amaryl) 2 mg tablet; Take 1 tablet (2 mg) by mouth 2 times a day and metFORMIN (Glucophage) 1,000 mg tablet; Take 1 TABLETS BY MOUTH TWICE DAILY WITH MEALS  -     blood sugar diagnostic (True Metrix Glucose Test Strip) strip; 1 each 2 times a day.  -     lancets 30 gauge misc; 1 each 2 times a  day.    Essential (primary) hypertension  -     amLODIPine (Norvasc) 5 mg tablet; Take 1 tablet (5 mg) by mouth once daily.  -     bisoproloL-hydrochlorothiazide (Ziac) 5-6.25 mg tablet; Take 1 tablet by mouth once daily. as directed  -     enalapril (Vasotec) 20 mg tablet; Take 1 tablet (20 mg) by mouth once daily. as directed  -     CBC and Auto Differential; Future  -     Tsh With Reflex To Free T4 If Abnormal; Future    Hypokalemia  -     potassium chloride CR 20 mEq ER tablet; Take 1 tablet (20 mEq) by mouth once daily. Do not crush or chew.  -     Comprehensive metabolic panel; Future    Colon cancer screening  -     Cologuard® colon cancer screening; Future    Lipid screening  -     Lipid panel; Future    Osteopenia  - continue with calcium and vitamin d supplements    Urinary incontinence with bladder prolapse  - continue to follow up with urology, she is considering bladder lift surgery      --------------------  Written by Willow Kennedy LPN, acting as a scribe for Dr. Cote. This note accurately reflects the work and decisions made by Dr. Cote.     I, Dr. Cote, attest all medical record entries made by the scribe were under my direction and were personally dictated by me. I have reviewed the chart and agree that the record accurately reflects my performance of the history, physical exam, and assessment and plan.

## 2024-02-26 NOTE — PATIENT INSTRUCTIONS
It was great to see you in the office today! Here is what we discussed at your visit today:  Please continue to take your current medications   As discussed, work on diet and exercise to bring sugars down  Please get bloodwork drawn on or after April 1st. We will call you with results.  Follow up in four months

## 2024-02-28 DIAGNOSIS — E87.6 HYPOKALEMIA: ICD-10-CM

## 2024-02-28 DIAGNOSIS — I10 ESSENTIAL (PRIMARY) HYPERTENSION: ICD-10-CM

## 2024-02-28 DIAGNOSIS — E11.9 TYPE 2 DIABETES MELLITUS WITHOUT COMPLICATIONS (MULTI): ICD-10-CM

## 2024-02-29 RX ORDER — GLIMEPIRIDE 2 MG/1
2 TABLET ORAL 2 TIMES DAILY
Qty: 180 TABLET | Refills: 0 | Status: SHIPPED | OUTPATIENT
Start: 2024-02-29 | End: 2024-06-03

## 2024-02-29 RX ORDER — BISOPROLOL FUMARATE AND HYDROCHLOROTHIAZIDE 5; 6.25 MG/1; MG/1
1 TABLET ORAL DAILY
Qty: 90 TABLET | Refills: 0 | Status: SHIPPED | OUTPATIENT
Start: 2024-02-29 | End: 2024-06-03

## 2024-02-29 RX ORDER — ENALAPRIL MALEATE 20 MG/1
20 TABLET ORAL DAILY
Qty: 90 TABLET | Refills: 0 | Status: SHIPPED | OUTPATIENT
Start: 2024-02-29 | End: 2024-06-03

## 2024-02-29 RX ORDER — POTASSIUM CHLORIDE 20 MEQ/1
20 TABLET, EXTENDED RELEASE ORAL DAILY
Qty: 90 TABLET | Refills: 0 | Status: SHIPPED | OUTPATIENT
Start: 2024-02-29 | End: 2024-06-03

## 2024-02-29 RX ORDER — METFORMIN HYDROCHLORIDE 1000 MG/1
TABLET ORAL
Qty: 180 TABLET | Refills: 0 | Status: SHIPPED | OUTPATIENT
Start: 2024-02-29 | End: 2024-06-03

## 2024-03-14 LAB — NONINV COLON CA DNA+OCC BLD SCRN STL QL: NEGATIVE

## 2024-04-01 ENCOUNTER — APPOINTMENT (OUTPATIENT)
Dept: OBSTETRICS AND GYNECOLOGY | Facility: CLINIC | Age: 74
End: 2024-04-01
Payer: MEDICARE

## 2024-05-06 ENCOUNTER — OFFICE VISIT (OUTPATIENT)
Dept: OBSTETRICS AND GYNECOLOGY | Facility: CLINIC | Age: 74
End: 2024-05-06
Payer: MEDICARE

## 2024-05-06 VITALS
WEIGHT: 150 LBS | DIASTOLIC BLOOD PRESSURE: 76 MMHG | SYSTOLIC BLOOD PRESSURE: 112 MMHG | BODY MASS INDEX: 29.45 KG/M2 | HEIGHT: 60 IN

## 2024-05-06 DIAGNOSIS — N81.9 FEMALE GENITAL PROLAPSE, UNSPECIFIED TYPE: Primary | ICD-10-CM

## 2024-05-06 PROCEDURE — 1160F RVW MEDS BY RX/DR IN RCRD: CPT | Performed by: OBSTETRICS & GYNECOLOGY

## 2024-05-06 PROCEDURE — 4010F ACE/ARB THERAPY RXD/TAKEN: CPT | Performed by: OBSTETRICS & GYNECOLOGY

## 2024-05-06 PROCEDURE — 1159F MED LIST DOCD IN RCRD: CPT | Performed by: OBSTETRICS & GYNECOLOGY

## 2024-05-06 PROCEDURE — 3078F DIAST BP <80 MM HG: CPT | Performed by: OBSTETRICS & GYNECOLOGY

## 2024-05-06 PROCEDURE — 99204 OFFICE O/P NEW MOD 45 MIN: CPT | Performed by: OBSTETRICS & GYNECOLOGY

## 2024-05-06 PROCEDURE — 3074F SYST BP LT 130 MM HG: CPT | Performed by: OBSTETRICS & GYNECOLOGY

## 2024-05-06 NOTE — PROGRESS NOTES
Subjective   Patient ID: Lynn Foote is a 73 y.o. female who presents for Prolapse.  Review of past history,    [facility]  Outside Information  Mahesh Cartagena  Physician     Progress Notes     Signed     Encounter Date: 11/11/2010  Note Received: 5/6/2024  3:04 PM       Signed     CHIEF COMPLAINT:  Lynn Foote is a 60 year old,  G 2 P 2 diabetic female S/P hyst who presents for consultation requested by Self Referred for an opinion regarding recurrent prolapse and DOUGLAS.  In 1998 Dr Cartagena did Anterior  and  posterior colpoperineorrhaphy, iliococcygeal vaginal vault   suspension, suburethral  fascial  sling  procedure,  suprapubic  tube insertion, and cystoscopy.       On po ERT low dose.     HISTORY OF PRESENT ILLNESS:  Urinary Incontinence:  Yes - daily;  Pads daily;     Do you usually experience urine leakage related to coughing, sneezing, or laughing:  yes,   Do you usually experience urine leakage related to physical exercise such as walking, running, aerobics, or tennis:  yes,   Do you usually experience urine leakage related to lifting or bending over:  yes,   Do you usually experience urine leakage related to a feeling of urgency:  yes,    Leaks with: Cough/sneeze, Run/exercise, Walk, Lifting, Urge  Previous UI Treatment: Surgical  Voiding Dysfunction: Feeling of incomplete emptying, spray stream  Urinary Urgency: yes,   # of Daytime Voids: 12  # of Nocturic Episodes: 2 per day  Dysuria: no  Hematuria: no  Recurrent UTI: No  Nocturnal Enuresis: no     Prolapse Symptoms:   Do You usually have a sensation of bulging or protrusion from the vaginal area:  yes,    Do you usually have a bulge or something falling out that you can see or feel in the vaginal area:  yes,   Yes - Pelvic pressure, Sees or feels protrusion vaginally  Extent of protrusion: Beyond introitus   Previous Treatment: Surgery      Defecatory Symptoms: Diarrhea  Number of Bowel Movements: multiple times per Day  Fecal Incontinence:  small amount at times     Sexual Dysfunction: Not active;      GYN HISTORY: Last pap: NA;  Last mammogram: Her last mammogram was . She has no history of an abnormal mammogram  LMP: No LMP recorded. Patient has had a hysterectomy.;    Menopause n/a: Menstrual history:    Deliveries:           PAST MEDICAL HISTORY  Diabetic  Fibromyalgia             PAST SURGICAL HISTORY  Posterior Colporrhaphy, Repair Rectocele -   Hysterectomy Hx  Removal of Ovary/Tube(s)             Tobacco Use: Not on file  Alcohol Use: Not on file     REVIEW OF SYSTEMS  General: No weight loss, malaise or fevers.  Skin negative  Psychiatric negative  Neurologic No history of headaches, syncope, paralysis, seizures or tremors  Endocrine diabetes  Cardiovascular No history of chest pain, palpitation, orthopnea, cynosis, pedel edema  Hematologic/Lymphatic negative  Respiratory No cough, hemoptysis, asthma, recent chest infection, wheezing  Gastrointestinal diarrhea  MusculoskeletalPositive history of: fibromyalgia     OBJECTIVE:     Physical Exam:  Constitutional: BMI - Body mass index is 32.50 kg/(m^2).  General appearance: well appearing, alert, in no acute distress  Skin: skin color, texture, turgor normal, no rashes or lesions  Neck: Supple, no adenopathy  Back No CVAT     Abdomen: Abdomen soft, non-tender. ML scar. No masses, organomegaly  Pelvic:  Ext. Genitalia: No lesions or other abnormalities  Vagina: Ant wall - Cystocele Stage III / IV ;  Post wall -Normal support  Cervix / [place]ler - Stage l prolapse     See POP-Q     Cervix: Absent  Urethra: Hypermobile   Bimanual: No tenderness, No masses  Rectovaginal: No tenderness, No masses  Stool guiaic: Not done        POP-Q: Prolapse noted: Yes  Aa = +3.0   Ba = +4.0   C = -3  gh = 4   pb = 3   tvl = 7  Ap = -2.5   Bp = -2.5   D = N/A     IMPRESSION: Lynn Foote is a 60 year old diabetic female with recurrent Urinary Incontinence, Pelvic Organ Prolapse Stage III with dominant  anterior component, somewhat bothersome     PLAN:  If she desires surgery recommend Anterior repair with Uphold mesh or SSLF, possible TVT, ? [place].  She is unsure about having any mesh put in     She will consider and call to schedule.     Mahesh Cartagena MD           My final recommendations will be communicated back to the requesting physician by way of shared Medical record or letter via US mail.       New patient to our office 73 years old  para 1 vaginal delivery.  She had a hysterectomy and then completed 15 years later    Aware of her bladder following for a year or 2.  Reviewed her previous surgery as noted above.    Exam there is mainly both an anterior vaginal wall prolapse.  Minimal incontinence that she requires a pad.  Would recommend pelvic floor physical therapy.  If there is no significant improvement can consider consultation with urogynecology    On meds for hypertension diabetes            Review of Systems    Objective   Physical Exam    Assessment/Plan   Exam confirms post hysterectomy prolapse mainly in vaginal vault with cystocele.  Grade 2-3.  Recommend pelvic floor physical therapy.  If she does not get enough relief consider urogyn consultation         Latrell Manuel MD 05/06/24 3:05 PM

## 2024-06-02 DIAGNOSIS — E87.6 HYPOKALEMIA: ICD-10-CM

## 2024-06-02 DIAGNOSIS — I10 ESSENTIAL (PRIMARY) HYPERTENSION: ICD-10-CM

## 2024-06-02 DIAGNOSIS — E11.9 TYPE 2 DIABETES MELLITUS WITHOUT COMPLICATIONS (MULTI): ICD-10-CM

## 2024-06-03 RX ORDER — POTASSIUM CHLORIDE 20 MEQ/1
20 TABLET, EXTENDED RELEASE ORAL DAILY
Qty: 90 TABLET | Refills: 0 | Status: SHIPPED | OUTPATIENT
Start: 2024-06-03

## 2024-06-03 RX ORDER — CALCIUM CITRATE/VITAMIN D3 200MG-6.25
1 TABLET ORAL 2 TIMES DAILY
Qty: 100 STRIP | Refills: 0 | Status: SHIPPED | OUTPATIENT
Start: 2024-06-03

## 2024-06-03 RX ORDER — GLIMEPIRIDE 2 MG/1
2 TABLET ORAL 2 TIMES DAILY
Qty: 180 TABLET | Refills: 0 | Status: SHIPPED | OUTPATIENT
Start: 2024-06-03

## 2024-06-03 RX ORDER — ENALAPRIL MALEATE 20 MG/1
20 TABLET ORAL DAILY
Qty: 90 TABLET | Refills: 0 | Status: SHIPPED | OUTPATIENT
Start: 2024-06-03

## 2024-06-03 RX ORDER — METFORMIN HYDROCHLORIDE 1000 MG/1
TABLET ORAL
Qty: 180 TABLET | Refills: 0 | Status: SHIPPED | OUTPATIENT
Start: 2024-06-03

## 2024-06-03 RX ORDER — BISOPROLOL FUMARATE AND HYDROCHLOROTHIAZIDE 5; 6.25 MG/1; MG/1
1 TABLET ORAL DAILY
Qty: 90 TABLET | Refills: 0 | Status: SHIPPED | OUTPATIENT
Start: 2024-06-03

## 2024-06-03 RX ORDER — AMLODIPINE BESYLATE 5 MG/1
5 TABLET ORAL DAILY
Qty: 90 TABLET | Refills: 0 | Status: SHIPPED | OUTPATIENT
Start: 2024-06-03

## 2024-06-06 ENCOUNTER — LAB (OUTPATIENT)
Dept: LAB | Facility: LAB | Age: 74
End: 2024-06-06
Payer: MEDICARE

## 2024-06-06 DIAGNOSIS — I10 ESSENTIAL (PRIMARY) HYPERTENSION: ICD-10-CM

## 2024-06-06 DIAGNOSIS — E87.6 HYPOKALEMIA: ICD-10-CM

## 2024-06-06 DIAGNOSIS — Z13.220 LIPID SCREENING: ICD-10-CM

## 2024-06-06 LAB
BASOPHILS # BLD AUTO: 0.07 X10*3/UL (ref 0–0.1)
BASOPHILS NFR BLD AUTO: 0.9 %
EOSINOPHIL # BLD AUTO: 0.57 X10*3/UL (ref 0–0.4)
EOSINOPHIL NFR BLD AUTO: 7.6 %
ERYTHROCYTE [DISTWIDTH] IN BLOOD BY AUTOMATED COUNT: 12.8 % (ref 11.5–14.5)
HCT VFR BLD AUTO: 44.7 % (ref 36–46)
HGB BLD-MCNC: 14.7 G/DL (ref 12–16)
IMM GRANULOCYTES # BLD AUTO: 0.01 X10*3/UL (ref 0–0.5)
IMM GRANULOCYTES NFR BLD AUTO: 0.1 % (ref 0–0.9)
LYMPHOCYTES # BLD AUTO: 2.98 X10*3/UL (ref 0.8–3)
LYMPHOCYTES NFR BLD AUTO: 39.9 %
MCH RBC QN AUTO: 29.3 PG (ref 26–34)
MCHC RBC AUTO-ENTMCNC: 32.9 G/DL (ref 32–36)
MCV RBC AUTO: 89 FL (ref 80–100)
MONOCYTES # BLD AUTO: 0.49 X10*3/UL (ref 0.05–0.8)
MONOCYTES NFR BLD AUTO: 6.6 %
NEUTROPHILS # BLD AUTO: 3.34 X10*3/UL (ref 1.6–5.5)
NEUTROPHILS NFR BLD AUTO: 44.9 %
NRBC BLD-RTO: 0 /100 WBCS (ref 0–0)
PLATELET # BLD AUTO: 345 X10*3/UL (ref 150–450)
RBC # BLD AUTO: 5.01 X10*6/UL (ref 4–5.2)
TSH SERPL-ACNC: 3.27 MIU/L (ref 0.44–3.98)
WBC # BLD AUTO: 7.5 X10*3/UL (ref 4.4–11.3)

## 2024-06-06 PROCEDURE — 84443 ASSAY THYROID STIM HORMONE: CPT

## 2024-06-06 PROCEDURE — 80053 COMPREHEN METABOLIC PANEL: CPT

## 2024-06-06 PROCEDURE — 85025 COMPLETE CBC W/AUTO DIFF WBC: CPT

## 2024-06-06 PROCEDURE — 80061 LIPID PANEL: CPT

## 2024-06-06 PROCEDURE — 36415 COLL VENOUS BLD VENIPUNCTURE: CPT

## 2024-06-07 LAB
ALBUMIN SERPL BCP-MCNC: 4.4 G/DL (ref 3.4–5)
ALP SERPL-CCNC: 32 U/L (ref 33–136)
ALT SERPL W P-5'-P-CCNC: 17 U/L (ref 7–45)
ANION GAP SERPL CALC-SCNC: 14 MMOL/L (ref 10–20)
AST SERPL W P-5'-P-CCNC: 23 U/L (ref 9–39)
BILIRUB SERPL-MCNC: 0.5 MG/DL (ref 0–1.2)
BUN SERPL-MCNC: 11 MG/DL (ref 6–23)
CALCIUM SERPL-MCNC: 9.6 MG/DL (ref 8.6–10.3)
CHLORIDE SERPL-SCNC: 103 MMOL/L (ref 98–107)
CHOLEST SERPL-MCNC: 226 MG/DL (ref 0–199)
CHOLESTEROL/HDL RATIO: 3.6
CO2 SERPL-SCNC: 27 MMOL/L (ref 21–32)
CREAT SERPL-MCNC: 0.88 MG/DL (ref 0.5–1.05)
EGFRCR SERPLBLD CKD-EPI 2021: 68 ML/MIN/1.73M*2
GLUCOSE SERPL-MCNC: 188 MG/DL (ref 74–99)
HDLC SERPL-MCNC: 63.4 MG/DL
LDLC SERPL CALC-MCNC: 127 MG/DL
NON HDL CHOLESTEROL: 163 MG/DL (ref 0–149)
POTASSIUM SERPL-SCNC: 4.1 MMOL/L (ref 3.5–5.3)
PROT SERPL-MCNC: 7 G/DL (ref 6.4–8.2)
SODIUM SERPL-SCNC: 140 MMOL/L (ref 136–145)
TRIGL SERPL-MCNC: 180 MG/DL (ref 0–149)
VLDL: 36 MG/DL (ref 0–40)

## 2024-06-10 DIAGNOSIS — E78.2 MIXED HYPERLIPIDEMIA: Primary | ICD-10-CM

## 2024-06-10 RX ORDER — ATORVASTATIN CALCIUM 20 MG/1
20 TABLET, FILM COATED ORAL DAILY
Qty: 90 TABLET | Refills: 0 | Status: SHIPPED | OUTPATIENT
Start: 2024-06-10 | End: 2025-07-15

## 2024-06-11 DIAGNOSIS — B00.89 HERPES SIMPLEX VIRUS (HSV) INFECTION OF BUTTOCK: Primary | ICD-10-CM

## 2024-06-11 RX ORDER — VALACYCLOVIR HYDROCHLORIDE 1 G/1
1000 TABLET, FILM COATED ORAL DAILY
Qty: 7 TABLET | Refills: 0 | Status: SHIPPED | OUTPATIENT
Start: 2024-06-11

## 2024-06-20 PROBLEM — J20.9 ACUTE BRONCHITIS DUE TO INFECTION: Status: RESOLVED | Noted: 2024-06-20 | Resolved: 2024-06-20

## 2024-06-20 PROBLEM — J01.91 ACUTE RECURRENT SINUSITIS: Status: ACTIVE | Noted: 2024-06-20

## 2024-06-24 ENCOUNTER — APPOINTMENT (OUTPATIENT)
Dept: PRIMARY CARE | Facility: CLINIC | Age: 74
End: 2024-06-24
Payer: MEDICARE

## 2024-06-24 VITALS
RESPIRATION RATE: 16 BRPM | TEMPERATURE: 97 F | BODY MASS INDEX: 29.35 KG/M2 | DIASTOLIC BLOOD PRESSURE: 81 MMHG | HEART RATE: 71 BPM | HEIGHT: 60 IN | WEIGHT: 149.5 LBS | SYSTOLIC BLOOD PRESSURE: 146 MMHG

## 2024-06-24 DIAGNOSIS — E87.6 HYPOKALEMIA: ICD-10-CM

## 2024-06-24 DIAGNOSIS — I10 ESSENTIAL (PRIMARY) HYPERTENSION: ICD-10-CM

## 2024-06-24 DIAGNOSIS — E78.2 MIXED HYPERLIPIDEMIA: ICD-10-CM

## 2024-06-24 DIAGNOSIS — Z00.00 MEDICARE ANNUAL WELLNESS VISIT, SUBSEQUENT: ICD-10-CM

## 2024-06-24 DIAGNOSIS — Z12.31 ENCOUNTER FOR SCREENING MAMMOGRAM FOR MALIGNANT NEOPLASM OF BREAST: ICD-10-CM

## 2024-06-24 DIAGNOSIS — I10 PRIMARY HYPERTENSION: ICD-10-CM

## 2024-06-24 DIAGNOSIS — E11.9 TYPE 2 DIABETES MELLITUS WITHOUT COMPLICATION, WITHOUT LONG-TERM CURRENT USE OF INSULIN (MULTI): ICD-10-CM

## 2024-06-24 DIAGNOSIS — Z00.00 ROUTINE GENERAL MEDICAL EXAMINATION AT HEALTH CARE FACILITY: Primary | ICD-10-CM

## 2024-06-24 LAB — POC HEMOGLOBIN A1C: 7.3 % (ref 4.2–6.5)

## 2024-06-24 ASSESSMENT — ENCOUNTER SYMPTOMS
PSYCHIATRIC NEGATIVE: 1
RESPIRATORY NEGATIVE: 1
CONSTITUTIONAL NEGATIVE: 1
CARDIOVASCULAR NEGATIVE: 1
GASTROINTESTINAL NEGATIVE: 1
NEUROLOGICAL NEGATIVE: 1
MUSCULOSKELETAL NEGATIVE: 1

## 2024-06-24 ASSESSMENT — PATIENT HEALTH QUESTIONNAIRE - PHQ9
1. LITTLE INTEREST OR PLEASURE IN DOING THINGS: NOT AT ALL
2. FEELING DOWN, DEPRESSED OR HOPELESS: NOT AT ALL
SUM OF ALL RESPONSES TO PHQ9 QUESTIONS 1 AND 2: 0

## 2024-06-24 ASSESSMENT — ACTIVITIES OF DAILY LIVING (ADL)
DRESSING: INDEPENDENT
GROCERY_SHOPPING: INDEPENDENT
BATHING: INDEPENDENT
MANAGING_FINANCES: INDEPENDENT
DOING_HOUSEWORK: INDEPENDENT
TAKING_MEDICATION: INDEPENDENT

## 2024-06-24 ASSESSMENT — PAIN SCALES - GENERAL: PAINLEVEL: 0-NO PAIN

## 2024-06-24 NOTE — PROGRESS NOTES
Subjective She states that she has been eating less sugar and carbohydrates diet to get her sugar down. She states that she has not started on lipid lowering agent as she was hesitant due to side effects. She states she has been feeling good, is going to start going to the [facility] tomorrow. She states she was told by urology to go to a place in [place] to do kegel exercises, but she has not started yet because she would rather see someone closer. She states she does not sleep well, but this is not new. Her hearing is ok, she has regular exams with her eye doctor.     Reason for Visit: Lynn Foote is an 74 y.o. female here for a Medicare Wellness visit.     Past Medical, Surgical, and Family History reviewed and updated in chart.    Reviewed all medications by prescribing practitioner or clinical pharmacist (such as prescriptions, OTCs, herbal therapies and supplements) and documented in the medical record.    HPI Lynn Foote is a 74 y.o. female with PMH remarkable for HLD, Osteopenia, HTN, Type 2 DM,  who presents to the office today for Establish Care.     Mammogram (40-75):  7/13/23 normal  Pap smear (21-65): n/a age  Labs: 6/6/24  Colonoscopy (45-75): 7/21/16,no polyps; negative cologuard on 3/5/24  Lung cancer screening (55-80 + 30 pack year + smoking/quit in last 15 years): n/a  DEXA (65+, q 2 years): 7/31/23 WN    Patient Care Team:  Christiano Ruiz MD as PCP - General (Internal Medicine)  Christiano Ruiz MD as PCP - [facility] Attributed Provider     Review of Systems   Constitutional: Negative.    HENT: Negative.     Respiratory: Negative.     Cardiovascular: Negative.    Gastrointestinal: Negative.    Genitourinary: Negative.    Musculoskeletal: Negative.    Neurological: Negative.    Psychiatric/Behavioral: Negative.       Objective   Vitals:  /81 (BP Location: Right arm)   Pulse 71   Temp 36.1 °C (97 °F)   Resp 16   Ht 1.524 m (5')   Wt 67.8 kg (149 lb 8 oz)   PF 98 L/min   BMI 29.20  kg/m²       Physical Exam  Vitals reviewed.   Constitutional:       Appearance: Normal appearance.   HENT:      Head: Normocephalic and atraumatic.   Cardiovascular:      Rate and Rhythm: Normal rate and regular rhythm.      Pulses: Normal pulses.      Heart sounds: Normal heart sounds.   Pulmonary:      Effort: Pulmonary effort is normal.      Breath sounds: Normal breath sounds.   Abdominal:      General: Bowel sounds are normal.      Palpations: Abdomen is soft.   Musculoskeletal:         General: Normal range of motion.   Skin:     General: Skin is warm and dry.   Neurological:      General: No focal deficit present.      Mental Status: She is alert and oriented to person, place, and time.   Psychiatric:         Mood and Affect: Mood normal.         Behavior: Behavior normal.       Assessment/Plan   Problem List Items Addressed This Visit       Hyperlipidemia    Current Assessment & Plan     She admits she has not started statin due to side effects she read about  Discussion was had with patient versus untreated long term affects of high cholesterol versus potential side effects of statin  She is agreeable to start medication, advised to stop medication and inform me if she develops any muscle aches         Hypertension    Current Assessment & Plan     Continue with current medications, BP is ok today, 146/81         Hypokalemia  - continue with potassium supplement      Type 2 diabetes mellitus without complication, without long-term current use of insulin (Multi)    Current Assessment & Plan     A1c is improved today at 7.3  Continue with current medications         Relevant Orders    POCT glycosylated hemoglobin (Hb A1C) manually resulted (Completed)     Other Visit Diagnoses       Routine general medical examination at health care facility    -  Primary        Encounter for screening mammogram for malignant neoplasm of breast        Relevant Orders    BI mammo bilateral screening tomosynthesis      --------------------  Written by Willow Kennedy LPN, acting as a scribe for Dr. Cote. This note accurately reflects the work and decisions made by Dr. Cote.     I, Dr. Cote, attest all medical record entries made by the scribe were under my direction and were personally dictated by me. I have reviewed the chart and agree that the record accurately reflects my performance of the history, physical exam, and assessment and plan.

## 2024-06-24 NOTE — PATIENT INSTRUCTIONS
It was nice to see you today for your annual Medicare Wellness visit.  As discussed during our visit today ...  Please continue to take your current medications   I have ordered you a mammogram to be done as soon as you are able.  Someone will call you soon to schedule this.   We will call the results.  Follow up in four months

## 2024-06-27 NOTE — ASSESSMENT & PLAN NOTE
She admits she has not started statin due to side effects she read about  Discussion was had with patient versus untreated long term affects of high cholesterol versus potential side effects of statin  She is agreeable to start medication, advised to stop medication and inform me if she develops any muscle aches

## 2024-07-26 DIAGNOSIS — E11.9 TYPE 2 DIABETES MELLITUS WITHOUT COMPLICATIONS (MULTI): ICD-10-CM

## 2024-07-29 RX ORDER — CALCIUM CITRATE/VITAMIN D3 200MG-6.25
TABLET ORAL 2 TIMES DAILY
Qty: 100 STRIP | Refills: 0 | Status: SHIPPED | OUTPATIENT
Start: 2024-07-29

## 2024-09-04 DIAGNOSIS — E87.6 HYPOKALEMIA: ICD-10-CM

## 2024-09-04 DIAGNOSIS — E11.9 TYPE 2 DIABETES MELLITUS WITHOUT COMPLICATIONS (MULTI): ICD-10-CM

## 2024-09-04 DIAGNOSIS — I10 ESSENTIAL (PRIMARY) HYPERTENSION: ICD-10-CM

## 2024-09-06 RX ORDER — METFORMIN HYDROCHLORIDE 1000 MG/1
TABLET ORAL
Qty: 180 TABLET | Refills: 0 | Status: SHIPPED | OUTPATIENT
Start: 2024-09-06

## 2024-09-06 RX ORDER — POTASSIUM CHLORIDE 20 MEQ/1
20 TABLET, EXTENDED RELEASE ORAL DAILY
Qty: 90 TABLET | Refills: 0 | Status: SHIPPED | OUTPATIENT
Start: 2024-09-06

## 2024-09-06 RX ORDER — GLIMEPIRIDE 2 MG/1
2 TABLET ORAL 2 TIMES DAILY
Qty: 180 TABLET | Refills: 0 | Status: SHIPPED | OUTPATIENT
Start: 2024-09-06

## 2024-09-06 RX ORDER — AMLODIPINE BESYLATE 5 MG/1
5 TABLET ORAL DAILY
Qty: 90 TABLET | Refills: 0 | Status: SHIPPED | OUTPATIENT
Start: 2024-09-06

## 2024-09-06 RX ORDER — ENALAPRIL MALEATE 20 MG/1
20 TABLET ORAL DAILY
Qty: 90 TABLET | Refills: 0 | Status: SHIPPED | OUTPATIENT
Start: 2024-09-06

## 2024-09-06 RX ORDER — BISOPROLOL FUMARATE AND HYDROCHLOROTHIAZIDE 5; 6.25 MG/1; MG/1
1 TABLET ORAL DAILY
Qty: 90 TABLET | Refills: 0 | Status: SHIPPED | OUTPATIENT
Start: 2024-09-06

## 2024-10-28 ENCOUNTER — APPOINTMENT (OUTPATIENT)
Dept: PRIMARY CARE | Facility: CLINIC | Age: 74
End: 2024-10-28
Payer: MEDICARE

## 2024-10-28 VITALS
OXYGEN SATURATION: 98 % | DIASTOLIC BLOOD PRESSURE: 82 MMHG | SYSTOLIC BLOOD PRESSURE: 163 MMHG | RESPIRATION RATE: 16 BRPM | BODY MASS INDEX: 28.51 KG/M2 | TEMPERATURE: 96.9 F | HEART RATE: 68 BPM | WEIGHT: 145.2 LBS | HEIGHT: 60 IN

## 2024-10-28 DIAGNOSIS — E87.6 HYPOKALEMIA: ICD-10-CM

## 2024-10-28 DIAGNOSIS — B00.89 HERPES SIMPLEX VIRUS (HSV) INFECTION OF BUTTOCK: ICD-10-CM

## 2024-10-28 DIAGNOSIS — E78.2 MIXED HYPERLIPIDEMIA: ICD-10-CM

## 2024-10-28 DIAGNOSIS — E11.9 TYPE 2 DIABETES MELLITUS WITHOUT COMPLICATION, WITHOUT LONG-TERM CURRENT USE OF INSULIN (MULTI): ICD-10-CM

## 2024-10-28 DIAGNOSIS — I10 PRIMARY HYPERTENSION: ICD-10-CM

## 2024-10-28 LAB — POC HEMOGLOBIN A1C: 7.2 % (ref 4.2–6.5)

## 2024-10-28 PROCEDURE — 1126F AMNT PAIN NOTED NONE PRSNT: CPT | Performed by: INTERNAL MEDICINE

## 2024-10-28 PROCEDURE — 1158F ADVNC CARE PLAN TLK DOCD: CPT | Performed by: INTERNAL MEDICINE

## 2024-10-28 PROCEDURE — 83036 HEMOGLOBIN GLYCOSYLATED A1C: CPT | Performed by: INTERNAL MEDICINE

## 2024-10-28 PROCEDURE — 3077F SYST BP >= 140 MM HG: CPT | Performed by: INTERNAL MEDICINE

## 2024-10-28 PROCEDURE — 1159F MED LIST DOCD IN RCRD: CPT | Performed by: INTERNAL MEDICINE

## 2024-10-28 PROCEDURE — 1123F ACP DISCUSS/DSCN MKR DOCD: CPT | Performed by: INTERNAL MEDICINE

## 2024-10-28 PROCEDURE — 3079F DIAST BP 80-89 MM HG: CPT | Performed by: INTERNAL MEDICINE

## 2024-10-28 PROCEDURE — 99214 OFFICE O/P EST MOD 30 MIN: CPT | Performed by: INTERNAL MEDICINE

## 2024-10-28 PROCEDURE — 1036F TOBACCO NON-USER: CPT | Performed by: INTERNAL MEDICINE

## 2024-10-28 PROCEDURE — 1160F RVW MEDS BY RX/DR IN RCRD: CPT | Performed by: INTERNAL MEDICINE

## 2024-10-28 RX ORDER — ZOSTER VACCINE RECOMBINANT, ADJUVANTED 50 MCG/0.5
KIT INTRAMUSCULAR
COMMUNITY
Start: 2024-06-06

## 2024-10-28 RX ORDER — VALACYCLOVIR HYDROCHLORIDE 1 G/1
1000 TABLET, FILM COATED ORAL DAILY
Qty: 7 TABLET | Refills: 0 | Status: SHIPPED | OUTPATIENT
Start: 2024-10-28

## 2024-10-28 ASSESSMENT — PAIN SCALES - GENERAL: PAINLEVEL_OUTOF10: 0-NO PAIN

## 2024-10-28 ASSESSMENT — PATIENT HEALTH QUESTIONNAIRE - PHQ9
SUM OF ALL RESPONSES TO PHQ9 QUESTIONS 1 AND 2: 0
2. FEELING DOWN, DEPRESSED OR HOPELESS: NOT AT ALL
1. LITTLE INTEREST OR PLEASURE IN DOING THINGS: NOT AT ALL

## 2024-10-28 ASSESSMENT — ENCOUNTER SYMPTOMS
PSYCHIATRIC NEGATIVE: 1
CONSTITUTIONAL NEGATIVE: 1
RESPIRATORY NEGATIVE: 1
GASTROINTESTINAL NEGATIVE: 1
MUSCULOSKELETAL NEGATIVE: 1
NEUROLOGICAL NEGATIVE: 1
CARDIOVASCULAR NEGATIVE: 1

## 2024-10-31 ENCOUNTER — TELEPHONE (OUTPATIENT)
Dept: PRIMARY CARE | Facility: CLINIC | Age: 74
End: 2024-10-31
Payer: MEDICARE

## 2024-10-31 DIAGNOSIS — E87.6 HYPOKALEMIA: Primary | ICD-10-CM

## 2024-11-04 ENCOUNTER — LAB (OUTPATIENT)
Dept: LAB | Facility: LAB | Age: 74
End: 2024-11-04
Payer: MEDICARE

## 2024-11-04 DIAGNOSIS — E87.6 HYPOKALEMIA: ICD-10-CM

## 2024-11-04 LAB
ANION GAP SERPL CALC-SCNC: 15 MMOL/L (ref 10–20)
BUN SERPL-MCNC: 9 MG/DL (ref 6–23)
CALCIUM SERPL-MCNC: 9.1 MG/DL (ref 8.6–10.3)
CHLORIDE SERPL-SCNC: 102 MMOL/L (ref 98–107)
CO2 SERPL-SCNC: 27 MMOL/L (ref 21–32)
CREAT SERPL-MCNC: 0.82 MG/DL (ref 0.5–1.05)
EGFRCR SERPLBLD CKD-EPI 2021: 74 ML/MIN/1.73M*2
GLUCOSE SERPL-MCNC: 147 MG/DL (ref 74–99)
POTASSIUM SERPL-SCNC: 3.5 MMOL/L (ref 3.5–5.3)
SODIUM SERPL-SCNC: 140 MMOL/L (ref 136–145)

## 2024-11-04 PROCEDURE — 80048 BASIC METABOLIC PNL TOTAL CA: CPT

## 2024-11-04 PROCEDURE — 36415 COLL VENOUS BLD VENIPUNCTURE: CPT

## 2024-11-15 ENCOUNTER — TELEPHONE (OUTPATIENT)
Dept: PRIMARY CARE | Facility: CLINIC | Age: 74
End: 2024-11-15
Payer: MEDICARE

## 2024-12-04 DIAGNOSIS — E11.9 TYPE 2 DIABETES MELLITUS WITHOUT COMPLICATIONS (MULTI): ICD-10-CM

## 2024-12-04 DIAGNOSIS — E87.6 HYPOKALEMIA: ICD-10-CM

## 2024-12-04 DIAGNOSIS — I10 ESSENTIAL (PRIMARY) HYPERTENSION: ICD-10-CM

## 2024-12-09 RX ORDER — METFORMIN HYDROCHLORIDE 1000 MG/1
TABLET ORAL
Qty: 180 TABLET | Refills: 0 | Status: SHIPPED | OUTPATIENT
Start: 2024-12-09

## 2024-12-09 RX ORDER — CALCIUM CITRATE/VITAMIN D3 200MG-6.25
TABLET ORAL 2 TIMES DAILY
Qty: 200 STRIP | Refills: 0 | Status: SHIPPED | OUTPATIENT
Start: 2024-12-09

## 2024-12-09 RX ORDER — POTASSIUM CHLORIDE 20 MEQ/1
20 TABLET, EXTENDED RELEASE ORAL DAILY
Qty: 90 TABLET | Refills: 0 | Status: SHIPPED | OUTPATIENT
Start: 2024-12-09

## 2024-12-09 RX ORDER — GLIMEPIRIDE 2 MG/1
2 TABLET ORAL 2 TIMES DAILY
Qty: 180 TABLET | Refills: 0 | Status: SHIPPED | OUTPATIENT
Start: 2024-12-09

## 2024-12-09 RX ORDER — ENALAPRIL MALEATE 20 MG/1
20 TABLET ORAL DAILY
Qty: 90 TABLET | Refills: 0 | Status: SHIPPED | OUTPATIENT
Start: 2024-12-09

## 2024-12-09 RX ORDER — AMLODIPINE BESYLATE 5 MG/1
5 TABLET ORAL DAILY
Qty: 90 TABLET | Refills: 0 | Status: SHIPPED | OUTPATIENT
Start: 2024-12-09

## 2024-12-09 RX ORDER — BISOPROLOL FUMARATE AND HYDROCHLOROTHIAZIDE 5; 6.25 MG/1; MG/1
1 TABLET ORAL DAILY
Qty: 90 TABLET | Refills: 0 | Status: SHIPPED | OUTPATIENT
Start: 2024-12-09

## 2025-03-03 ENCOUNTER — APPOINTMENT (OUTPATIENT)
Dept: PRIMARY CARE | Facility: CLINIC | Age: 75
End: 2025-03-03
Payer: MEDICARE

## 2025-03-03 VITALS
OXYGEN SATURATION: 98 % | HEART RATE: 75 BPM | SYSTOLIC BLOOD PRESSURE: 130 MMHG | WEIGHT: 145.5 LBS | DIASTOLIC BLOOD PRESSURE: 84 MMHG | BODY MASS INDEX: 28.42 KG/M2

## 2025-03-03 DIAGNOSIS — E11.9 TYPE 2 DIABETES MELLITUS WITHOUT COMPLICATION, WITHOUT LONG-TERM CURRENT USE OF INSULIN (MULTI): ICD-10-CM

## 2025-03-03 DIAGNOSIS — M79.602 BILATERAL ARM PAIN: ICD-10-CM

## 2025-03-03 DIAGNOSIS — E78.2 MIXED HYPERLIPIDEMIA: ICD-10-CM

## 2025-03-03 DIAGNOSIS — I10 PRIMARY HYPERTENSION: ICD-10-CM

## 2025-03-03 DIAGNOSIS — M79.601 BILATERAL ARM PAIN: ICD-10-CM

## 2025-03-03 DIAGNOSIS — E87.6 HYPOKALEMIA: ICD-10-CM

## 2025-03-03 LAB — POC HEMOGLOBIN A1C: 7.4 % (ref 4.2–6.5)

## 2025-03-03 RX ORDER — MELOXICAM 15 MG/1
15 TABLET ORAL DAILY PRN
Qty: 30 TABLET | Refills: 0 | Status: SHIPPED | OUTPATIENT
Start: 2025-03-03 | End: 2026-03-03

## 2025-03-03 RX ORDER — BISOPROLOL FUMARATE 5 MG/1
5 TABLET, FILM COATED ORAL DAILY
Qty: 30 TABLET | Refills: 2 | Status: SHIPPED | OUTPATIENT
Start: 2025-03-03 | End: 2026-03-03

## 2025-03-03 ASSESSMENT — PAIN SCALES - GENERAL: PAINLEVEL_OUTOF10: 0-NO PAIN

## 2025-03-03 NOTE — PROGRESS NOTES
Has been having pain in left arm, has only been having the pains at night, only having the throbbing pains in upper parts of arms, states sometimes it is both arms

## 2025-03-03 NOTE — PROGRESS NOTES
"Patient ID: She states that she has been having a \"throbbing pain\" in her bilateral upper extremities for past couple of months. States it sometimes wakes her up from sleep. She states it is painful when she is combing her hair. States it is more painful when she raises her arms. She states she does do some heavy lifting. She states she would go to the [facility] for water exercise, but they have closed it down for awhile, she had been going to that regularly. Denies any constipation or diarrhea. She denies any urinary issues. She states she has gotten    HEALTH MAINTENANCE: FOLLOW UP   Last Office Visit: 10/28/24  Mammogram (40-75): 8/7/24 normal  Labs: 6/6/24, A1c on 6/24/24 was 7.3, A1c on 10/28/24 was 7.2  Colonoscopy (45-75): 7/21/16,no polyps; negative cologuard on 3/5/24  Lung cancer screening (55-80 + 30 pack year + smoking/quit in last 15 years): n/a  DEXA (65+, q 2 years): 7/31/23 WNL  - saw Dr. Manuel on 05/06/24 and per his note: \"Exam confirms post hysterectomy prolapse mainly in vaginal vault with cystocele. Grade 2-3. Recommend pelvic floor physical therapy. If she does not get enough relief consider urogyn consultation\"  - was seen by ophthalmology on 09/16/24 for eye exam    HPI Lynn Foote is a 74 y.o. female with PMH remarkable for HLD, Osteopenia, HTN, Type 2 DM, who presents to the office today for follow up.     Social History     Tobacco Use    Smoking status: Never    Smokeless tobacco: Never   Vaping Use    Vaping status: Never Used   Substance Use Topics    Alcohol use: Never    Drug use: Never     Review of Systems   Constitutional: Negative.    HENT: Negative.     Respiratory: Negative.     Cardiovascular: Negative.    Gastrointestinal: Negative.    Genitourinary: Negative.    Musculoskeletal:         + BUE pain   Skin: Negative.    Neurological: Negative.    Psychiatric/Behavioral: Negative.       Visit Vitals  Vitals:    03/03/25 1433   BP: 130/84   BP Location: Right arm   Pulse: 75 "   SpO2: 98%   Weight: 66 kg (145 lb 8 oz)      OB Status Postmenopausal   Smoking Status Never     Physical Exam  Vitals reviewed.   Constitutional:       Appearance: Normal appearance.   HENT:      Head: Normocephalic and atraumatic.   Cardiovascular:      Rate and Rhythm: Normal rate and regular rhythm.      Pulses: Normal pulses.      Heart sounds: Normal heart sounds.   Pulmonary:      Effort: Pulmonary effort is normal.      Breath sounds: Normal breath sounds.   Abdominal:      General: Bowel sounds are normal.      Palpations: Abdomen is soft.   Musculoskeletal:         General: Normal range of motion.      Cervical back: Normal range of motion.   Skin:     General: Skin is warm and dry.   Neurological:      General: No focal deficit present.      Mental Status: She is alert and oriented to person, place, and time.   Psychiatric:         Mood and Affect: Mood normal.         Behavior: Behavior normal.       Current Outpatient Medications   Medication Instructions    amLODIPine (NORVASC) 5 mg, oral, Daily    bisoproloL-hydrochlorothiazide (Ziac) 5-6.25 mg tablet 1 tablet, oral, Daily, as directed    [id number]-[id number]-zinc--chidi-bor (Caltrate 600-D Plus Minerals) 600 mg calcium- 800 unit-50 mg tablet 1 tablet, Daily    docosahexaenoic acid/epa (FISH OIL ORAL) Take by mouth.    dorzolamide (Trusopt) 2 % ophthalmic solution 1 drop, Both Eyes, 3 times daily    enalapril (VASOTEC) 20 mg, oral, Daily, as directed    fluticasone (Flonase) 50 mcg/actuation nasal spray 1 spray, Each Nostril, Daily, Shake gently. Before first use, prime pump. After use, clean tip and replace cap.    garlic 1,000 mg capsule 1 capsule, Daily    glimepiride (AMARYL) 2 mg, oral, 2 times daily    lancets 30 gauge misc 1 each, miscellaneous, 2 times daily    latanoprost (Xalatan) 0.005 % ophthalmic solution Administer into affected eye(s).    metFORMIN (Glucophage) 1,000 mg tablet Take 1 TABLETS BY MOUTH TWICE DAILY WITH MEALS     multivit-min/folic acid/vit K1 (MULTI FOR HER 50 PLUS ORAL) Take by mouth.    potassium chloride CR 20 mEq ER tablet 20 mEq, oral, Daily, Do not crush or chew    Shingrix, PF, 50 mcg/0.5 mL vaccine     True Metrix Glucose Meter misc 1 each    True Metrix Glucose Test Strip strip 2 times daily    Unilet Super Thin Lancets 30 gauge misc use THREE TIMES DAILY    valACYclovir (VALTREX) 1,000 mg, oral, Daily, Once a day for 7 days      Lab Results   Component Value Date    WBC 7.5 06/06/2024    HGB 14.7 06/06/2024    HCT 44.7 06/06/2024     06/06/2024    CHOL 226 (H) 06/06/2024    TRIG 180 (H) 06/06/2024    HDL 63.4 06/06/2024    ALT 17 06/06/2024    AST 23 06/06/2024     11/04/2024    K 3.5 11/04/2024     11/04/2024    CREATININE 0.82 11/04/2024    BUN 9 11/04/2024    CO2 27 11/04/2024    TSH 3.27 06/06/2024    HGBA1C 7.2 (A) 10/28/2024     Problem List Items Addressed This Visit             ICD-10-CM    Hyperlipidemia E78.5    Hypertension  Advised to Stop Ziac and Change to Bisoprolol 5mg daily by itself, prescription sent in  Continue with Enalapril and Amlodipine  Stop Potassium  Get bloodwork in two weeks, we will call you with results I10    Relevant Medications    bisoprolol (Zebeta) 5 mg tablet    Hypokalemia  - advised to stop potassium, get bloodwork in two weeks E87.6    Relevant Orders    Basic metabolic panel    Type 2 diabetes mellitus without complication, without long-term current use of insulin (Multi)  - A1c is 7.4 today  - continue with Metformin and Glimepiride E11.9    Relevant Orders    POCT glycosylated hemoglobin (Hb A1C) manually resulted (Completed)    Bilateral arm pain M79.601, M79.602    Relevant Medications    meloxicam (Mobic) 15 mg tablet    A prescription was sent in for Meloxicam 15mg daily, take daily as needed for arm pain       --------------------  Written by Jocelyn Kennedy RN, acting as a scribe for Dr. Cote. This note accurately reflects the work and decisions made  by Dr. Cote.     I, Dr. Cote, attest all medical record entries made by the scribe were under my direction and were personally dictated by me. I have reviewed the chart and agree that the record accurately reflects my performance of the history, physical exam, and assessment and plan.

## 2025-03-03 NOTE — PATIENT INSTRUCTIONS
It was great to see you in the office today! Here is what we discussed at your visit today:    Stop Ziac     Change to Bisoprolol 5mg daily by itself, prescription sent in    Stop Potassium    Get bloodwork in two weeks, we will call you with results    A prescription was sent in for Meloxicam 15mg daily, take daily as needed for arm pain    Follow up in four months

## 2025-03-05 DIAGNOSIS — I10 ESSENTIAL (PRIMARY) HYPERTENSION: ICD-10-CM

## 2025-03-05 DIAGNOSIS — E11.9 TYPE 2 DIABETES MELLITUS WITHOUT COMPLICATIONS (MULTI): ICD-10-CM

## 2025-03-05 NOTE — TELEPHONE ENCOUNTER
Est pt last seen 3-3, next visit 7-7, last labs are from 11-4, A1c was 7.4 from 3-3 visit, last bmp from 11-4 was wnl with exception of glucose at 147

## 2025-03-06 PROBLEM — M79.601 BILATERAL ARM PAIN: Status: ACTIVE | Noted: 2025-03-06

## 2025-03-06 PROBLEM — M79.602 BILATERAL ARM PAIN: Status: ACTIVE | Noted: 2025-03-06

## 2025-03-06 RX ORDER — CALCIUM CITRATE/VITAMIN D3 200MG-6.25
TABLET ORAL 2 TIMES DAILY
Qty: 200 STRIP | Refills: 0 | Status: SHIPPED | OUTPATIENT
Start: 2025-03-06

## 2025-03-06 RX ORDER — AMLODIPINE BESYLATE 5 MG/1
5 TABLET ORAL DAILY
Qty: 90 TABLET | Refills: 0 | Status: SHIPPED | OUTPATIENT
Start: 2025-03-06

## 2025-03-06 RX ORDER — ENALAPRIL MALEATE 20 MG/1
20 TABLET ORAL DAILY
Qty: 90 TABLET | Refills: 0 | Status: SHIPPED | OUTPATIENT
Start: 2025-03-06

## 2025-03-06 RX ORDER — GLIMEPIRIDE 2 MG/1
2 TABLET ORAL 2 TIMES DAILY
Qty: 180 TABLET | Refills: 0 | Status: SHIPPED | OUTPATIENT
Start: 2025-03-06

## 2025-03-06 RX ORDER — METFORMIN HYDROCHLORIDE 1000 MG/1
TABLET ORAL
Qty: 180 TABLET | Refills: 0 | Status: SHIPPED | OUTPATIENT
Start: 2025-03-06

## 2025-03-06 ASSESSMENT — ENCOUNTER SYMPTOMS
PSYCHIATRIC NEGATIVE: 1
GASTROINTESTINAL NEGATIVE: 1
CARDIOVASCULAR NEGATIVE: 1
RESPIRATORY NEGATIVE: 1
NEUROLOGICAL NEGATIVE: 1
CONSTITUTIONAL NEGATIVE: 1

## 2025-03-17 DIAGNOSIS — E87.6 HYPOKALEMIA: ICD-10-CM

## 2025-03-19 LAB
ANION GAP SERPL CALCULATED.4IONS-SCNC: 11 MMOL/L (CALC) (ref 7–17)
BUN SERPL-MCNC: 13 MG/DL (ref 7–25)
BUN/CREAT SERPL: ABNORMAL (CALC) (ref 6–22)
CALCIUM SERPL-MCNC: 9.8 MG/DL (ref 8.6–10.4)
CHLORIDE SERPL-SCNC: 103 MMOL/L (ref 98–110)
CO2 SERPL-SCNC: 25 MMOL/L (ref 20–32)
CREAT SERPL-MCNC: 0.77 MG/DL (ref 0.6–1)
EGFRCR SERPLBLD CKD-EPI 2021: 80 ML/MIN/1.73M2
GLUCOSE SERPL-MCNC: 159 MG/DL (ref 65–139)
POTASSIUM SERPL-SCNC: 3.9 MMOL/L (ref 3.5–5.3)
SODIUM SERPL-SCNC: 139 MMOL/L (ref 135–146)

## 2025-04-07 DIAGNOSIS — I10 PRIMARY HYPERTENSION: ICD-10-CM

## 2025-04-08 RX ORDER — BISOPROLOL FUMARATE 5 MG/1
5 TABLET, FILM COATED ORAL DAILY
Qty: 30 TABLET | Refills: 2 | Status: SHIPPED | OUTPATIENT
Start: 2025-04-08 | End: 2026-04-08

## 2025-05-24 DIAGNOSIS — I10 PRIMARY HYPERTENSION: ICD-10-CM

## 2025-05-27 RX ORDER — BISOPROLOL FUMARATE 5 MG/1
5 TABLET, FILM COATED ORAL DAILY
Qty: 30 TABLET | Refills: 2 | Status: SHIPPED | OUTPATIENT
Start: 2025-05-27

## 2025-06-08 DIAGNOSIS — E11.9 TYPE 2 DIABETES MELLITUS WITHOUT COMPLICATIONS: ICD-10-CM

## 2025-06-09 RX ORDER — CALCIUM CITRATE/VITAMIN D3 200MG-6.25
TABLET ORAL 2 TIMES DAILY
Qty: 200 STRIP | Refills: 0 | Status: SHIPPED | OUTPATIENT
Start: 2025-06-09

## 2025-07-07 ENCOUNTER — APPOINTMENT (OUTPATIENT)
Dept: PRIMARY CARE | Facility: CLINIC | Age: 75
End: 2025-07-07
Payer: MEDICARE

## 2025-07-07 VITALS
SYSTOLIC BLOOD PRESSURE: 131 MMHG | DIASTOLIC BLOOD PRESSURE: 81 MMHG | HEART RATE: 76 BPM | OXYGEN SATURATION: 97 % | BODY MASS INDEX: 28.82 KG/M2 | HEIGHT: 60 IN | RESPIRATION RATE: 16 BRPM | WEIGHT: 146.8 LBS

## 2025-07-07 DIAGNOSIS — E66.09 CLASS 1 OBESITY DUE TO EXCESS CALORIES WITH SERIOUS COMORBIDITY AND BODY MASS INDEX (BMI) OF 30.0 TO 30.9 IN ADULT: ICD-10-CM

## 2025-07-07 DIAGNOSIS — I10 HYPERTENSION, UNSPECIFIED TYPE: ICD-10-CM

## 2025-07-07 DIAGNOSIS — E55.9 VITAMIN D DEFICIENCY: ICD-10-CM

## 2025-07-07 DIAGNOSIS — E78.5 HYPERLIPIDEMIA, UNSPECIFIED HYPERLIPIDEMIA TYPE: ICD-10-CM

## 2025-07-07 DIAGNOSIS — Z12.31 ENCOUNTER FOR SCREENING MAMMOGRAM FOR MALIGNANT NEOPLASM OF BREAST: ICD-10-CM

## 2025-07-07 DIAGNOSIS — E66.811 CLASS 1 OBESITY DUE TO EXCESS CALORIES WITH SERIOUS COMORBIDITY AND BODY MASS INDEX (BMI) OF 30.0 TO 30.9 IN ADULT: ICD-10-CM

## 2025-07-07 DIAGNOSIS — Z78.0 POSTMENOPAUSAL: ICD-10-CM

## 2025-07-07 DIAGNOSIS — Z00.00 MEDICARE ANNUAL WELLNESS VISIT, SUBSEQUENT: ICD-10-CM

## 2025-07-07 DIAGNOSIS — E11.9 TYPE 2 DIABETES MELLITUS WITHOUT COMPLICATION, WITHOUT LONG-TERM CURRENT USE OF INSULIN: ICD-10-CM

## 2025-07-07 DIAGNOSIS — R53.82 CHRONIC FATIGUE: ICD-10-CM

## 2025-07-07 DIAGNOSIS — E87.6 HYPOKALEMIA: ICD-10-CM

## 2025-07-07 LAB — POC HEMOGLOBIN A1C: 7.9 % (ref 4.2–6.5)

## 2025-07-07 PROCEDURE — G0439 PPPS, SUBSEQ VISIT: HCPCS | Performed by: INTERNAL MEDICINE

## 2025-07-07 PROCEDURE — 1124F ACP DISCUSS-NO DSCNMKR DOCD: CPT | Performed by: INTERNAL MEDICINE

## 2025-07-07 PROCEDURE — 99213 OFFICE O/P EST LOW 20 MIN: CPT | Performed by: INTERNAL MEDICINE

## 2025-07-07 PROCEDURE — 1159F MED LIST DOCD IN RCRD: CPT | Performed by: INTERNAL MEDICINE

## 2025-07-07 PROCEDURE — 1160F RVW MEDS BY RX/DR IN RCRD: CPT | Performed by: INTERNAL MEDICINE

## 2025-07-07 PROCEDURE — 3075F SYST BP GE 130 - 139MM HG: CPT | Performed by: INTERNAL MEDICINE

## 2025-07-07 PROCEDURE — 1170F FXNL STATUS ASSESSED: CPT | Performed by: INTERNAL MEDICINE

## 2025-07-07 PROCEDURE — 3079F DIAST BP 80-89 MM HG: CPT | Performed by: INTERNAL MEDICINE

## 2025-07-07 PROCEDURE — 1126F AMNT PAIN NOTED NONE PRSNT: CPT | Performed by: INTERNAL MEDICINE

## 2025-07-07 PROCEDURE — 83036 HEMOGLOBIN GLYCOSYLATED A1C: CPT | Performed by: INTERNAL MEDICINE

## 2025-07-07 PROCEDURE — 1036F TOBACCO NON-USER: CPT | Performed by: INTERNAL MEDICINE

## 2025-07-07 SDOH — ECONOMIC STABILITY: FOOD INSECURITY: WITHIN THE PAST 12 MONTHS, THE FOOD YOU BOUGHT JUST DIDN'T LAST AND YOU DIDN'T HAVE MONEY TO GET MORE.: NEVER TRUE

## 2025-07-07 SDOH — ECONOMIC STABILITY: FOOD INSECURITY: WITHIN THE PAST 12 MONTHS, YOU WORRIED THAT YOUR FOOD WOULD RUN OUT BEFORE YOU GOT MONEY TO BUY MORE.: NEVER TRUE

## 2025-07-07 ASSESSMENT — PATIENT HEALTH QUESTIONNAIRE - PHQ9
1. LITTLE INTEREST OR PLEASURE IN DOING THINGS: NOT AT ALL
SUM OF ALL RESPONSES TO PHQ9 QUESTIONS 1 AND 2: 0
2. FEELING DOWN, DEPRESSED OR HOPELESS: NOT AT ALL
1. LITTLE INTEREST OR PLEASURE IN DOING THINGS: NOT AT ALL

## 2025-07-07 ASSESSMENT — ENCOUNTER SYMPTOMS
GASTROINTESTINAL NEGATIVE: 1
PSYCHIATRIC NEGATIVE: 1
CONSTITUTIONAL NEGATIVE: 1
MUSCULOSKELETAL NEGATIVE: 1
NEUROLOGICAL NEGATIVE: 1
RESPIRATORY NEGATIVE: 1
CARDIOVASCULAR NEGATIVE: 1

## 2025-07-07 ASSESSMENT — ACTIVITIES OF DAILY LIVING (ADL)
MANAGING_FINANCES: INDEPENDENT
GROCERY_SHOPPING: INDEPENDENT
BATHING: INDEPENDENT
DOING_HOUSEWORK: INDEPENDENT
TAKING_MEDICATION: INDEPENDENT

## 2025-07-07 ASSESSMENT — PAIN SCALES - GENERAL: PAINLEVEL_OUTOF10: 0-NO PAIN

## 2025-07-07 NOTE — ASSESSMENT & PLAN NOTE
A1c slightly more elevated today, 7.9  Continue with Glimepiride and Metformin  She is not interested in starting jardiance or Farxiga, would like to work on diet and exercise  I am agreeable with this plan, advised to try to get A1c back down to where it was previously,7.1 on previous exam

## 2025-07-07 NOTE — PATIENT INSTRUCTIONS
It was nice to see you today for your annual Medicare Wellness visit.  As discussed during our visit today ...    Please continue to take your current medications     Work on diet and exercise to get your A1c back down    Please have your blood drawn in the next 1-2 weeks.   You need to be FASTING for 12 hours prior to blood draw.  Please contact your insurance company to ask about the best location to get blood drawn.  We will contact you with the results of your blood work and any necessary adjustments to your plan of care.  Iif you do not hear from us within 3-5 days of having your blood drawn, please call the [place] office at [phone].    We ordered a dexa scan to check for osteoporosis. Please get this completed as soon as you are able. We will call you with results.     I have ordered you a mammogram to be done as soon as you are able.  Someone will call you soon to schedule this.   We will call the results.    Follow up in four months

## 2025-07-07 NOTE — ASSESSMENT & PLAN NOTE
- having a BMI of >30 kg/m2, mortality rates from all causes, and especially from cardiovascular disease increase by %.  - obesity increases your risk for diabetes, carotid artery disease, congestive heart failure, colon cancer, breast cancer, endometrial cancer, gallbladder caner, OA, sleep apnea, in addition to several other health issues.  - for every 2lb weight reduction, the systolic blood pressure drops almost 1 pt!

## 2025-07-07 NOTE — PROGRESS NOTES
"Patient ID: Lynn Foote is a 75 y.o. female with PMH remarkable for HLD, Osteopenia, HTN, Type 2 DM who presents to the office today for Medicare wellness exam.     HEALTH MAINTENANCE: Annual Medicare Wellness Physical  Last Office Visit: 3/3/25, A1c was 7.4  Mammogram (40-75): 8/7/24 normal  Labs: 6/6/24, A1c on 6/24/24 was 7.3, A1c on 10/28/24 was 7.2  Colonoscopy (45-75): 7/21/16,no polyps; negative cologuard on 3/5/24  Lung cancer screening (55-80 + 30 pack year + smoking/quit in last 15 years): n/a  DEXA (65+, q 2 years): 7/31/23 WNL  - saw Dr. Manuel on 05/06/24 and per his note: \"Exam confirms post hysterectomy prolapse mainly in vaginal vault with cystocele. Grade 2-3. Recommend pelvic floor physical therapy. If she does not get enough relief consider urogyn consultation\"  - was seen by ophthalmology on 09/16/24 for eye exam    HPI She states she quit going to the gym, has not been as active. She denies any depression. She denies any SOB, cough or CP. She states she sleeps ok. She states she does not fall asleep until 3am, and then sleeps until 11 or 12pm. She states she wakes up a couple times during night. She denies any issues with urination, denies incontinence of urine. Denies any issues with bowel or bladder.    Medical History[1]   Surgical History[2]   Social History[3]  Family History[4]   Immunization History   Administered Date(s) Administered    Flu vaccine, trivalent, preservative free, HIGH-DOSE, age 65y+ (Fluzone) 09/26/2016, 10/04/2019    Pneumococcal conjugate vaccine, 13-valent (PREVNAR 13) 01/05/2016    Pneumococcal polysaccharide vaccine, 23-valent, age 2 years and older (PNEUMOVAX 23) 03/02/2017    Zoster vaccine, recombinant, adult (SHINGRIX) 02/14/2024, 06/06/2024     Review of Systems   Constitutional: Negative.    HENT: Negative.     Respiratory: Negative.     Cardiovascular: Negative.    Gastrointestinal: Negative.    Genitourinary: Negative.    Musculoskeletal: Negative.  "   Skin: Negative.    Neurological: Negative.    Psychiatric/Behavioral: Negative.         Allergies[5]    Visit Vitals  Vitals:    07/07/25 1406   BP: 131/81   BP Location: Right arm   Pulse: 76   Resp: 16   SpO2: 97%   Weight: 66.6 kg (146 lb 12.8 oz)   Height: (!) 1.524 m (5')      OB Status Postmenopausal   Smoking Status Never     Physical Exam  Vitals reviewed.   Constitutional:       Appearance: Normal appearance.   HENT:      Head: Normocephalic and atraumatic.      Nose: Nose normal.      Mouth/Throat:      Mouth: Mucous membranes are moist.   Cardiovascular:      Rate and Rhythm: Normal rate and regular rhythm.      Pulses: Normal pulses.      Heart sounds: Normal heart sounds.   Pulmonary:      Effort: Pulmonary effort is normal.      Breath sounds: Normal breath sounds.   Abdominal:      General: Bowel sounds are normal.      Palpations: Abdomen is soft.   Musculoskeletal:         General: Normal range of motion.      Cervical back: Normal range of motion.   Skin:     General: Skin is warm and dry.   Neurological:      General: No focal deficit present.      Mental Status: She is alert and oriented to person, place, and time.   Psychiatric:         Mood and Affect: Mood normal.         Behavior: Behavior normal.       Current Outpatient Medications   Medication Instructions    amLODIPine (NORVASC) 5 mg, oral, Daily    bisoprolol (ZEBETA) 5 mg, oral, Daily    mxu-[id number]-[id number]-zinc--chidi-bor (Caltrate 600-D Plus Minerals) 600 mg calcium- 800 unit-50 mg tablet 1 tablet, Daily    docosahexaenoic acid/epa (FISH OIL ORAL) Take by mouth.    dorzolamide (Trusopt) 2 % ophthalmic solution 1 drop, Both Eyes, 3 times daily    enalapril (VASOTEC) 20 mg, oral, Daily, as directed    garlic 1,000 mg capsule 1 capsule, Daily    glimepiride (AMARYL) 2 mg, oral, 2 times daily    lancets 30 gauge misc 1 each, miscellaneous, 2 times daily    latanoprost (Xalatan) 0.005 % ophthalmic solution Administer into affected eye(s).     meloxicam (MOBIC) 15 mg, oral, Daily PRN    metFORMIN (Glucophage) 1,000 mg tablet Take 1 TABLETS BY MOUTH TWICE DAILY WITH MEALS    multivit-min/folic acid/vit K1 (MULTI FOR HER 50 PLUS ORAL) Take by mouth.    Shingrix, PF, 50 mcg/0.5 mL vaccine     True Metrix Glucose Meter misc 1 each    True Metrix Glucose Test Strip 2 times daily    Unilet Super Thin Lancets 30 gauge misc use THREE TIMES DAILY    valACYclovir (VALTREX) 1,000 mg, oral, Daily, Once a day for 7 days     Lab Results   Component Value Date    WBC 7.5 06/06/2024    HGB 14.7 06/06/2024    HCT 44.7 06/06/2024     06/06/2024    CHOL 226 (H) 06/06/2024    TRIG 180 (H) 06/06/2024    HDL 63.4 06/06/2024    ALT 17 06/06/2024    AST 23 06/06/2024     03/18/2025    K 3.9 03/18/2025     03/18/2025    CREATININE 0.77 03/18/2025    BUN 13 03/18/2025    CO2 25 03/18/2025    TSH 3.27 06/06/2024    HGBA1C 7.4 (A) 03/03/2025       Problem List Items Addressed This Visit           ICD-10-CM    Class 1 obesity due to excess calories with serious comorbidity and body mass index (BMI) of 30.0 to 30.9 in adult E66.811, E66.09, Z68.30    - having a BMI of >30 kg/m2, mortality rates from all causes, and especially from cardiovascular disease increase by %.  - obesity increases your risk for diabetes, carotid artery disease, congestive heart failure, colon cancer, breast cancer, endometrial cancer, gallbladder caner, OA, sleep apnea, in addition to several other health issues.  - for every 2lb weight reduction, the systolic blood pressure drops almost 1 pt!           Chronic fatigue R53.82    Relevant Orders    Vitamin B12    Hyperlipidemia E78.5    Relevant Orders    Lipid panel    Comprehensive metabolic panel    Hypertension I10    BP is good in office today, 131/81  Continue with Enalapril, Bisoprolol and Amlodipine         Relevant Orders    CBC and Auto Differential    Tsh With Reflex To Free T4 If Abnormal    Hypokalemia E87.6    Relevant  Orders    Comprehensive metabolic panel    Type 2 diabetes mellitus without complication, without long-term current use of insulin E11.9    A1c slightly more elevated today, 7.9  Continue with Glimepiride and Metformin  She is not interested in starting jardiance or Farxiga, would like to work on diet and exercise  I am agreeable with this plan, advised to try to get A1c back down to where it was previously,7.1 on previous exam         Relevant Orders    POCT glycosylated hemoglobin (Hb A1C) manually resulted (Completed)    Albumin-Creatinine Ratio, Urine Random    Comprehensive metabolic panel    Medicare annual wellness visit, subsequent Z00.00     Other Visit Diagnoses         Codes      Encounter for screening mammogram for malignant neoplasm of breast     Z12.31    Relevant Orders    BI mammo bilateral screening tomosynthesis      Postmenopausal     Z78.0    Relevant Orders    XR DEXA bone density      Vitamin D deficiency     E55.9    Relevant Orders    Vitamin D 25-Hydroxy,Total (for eval of Vitamin D levels)          --------------------  Written by Jocelyn Kennedy RN, acting as a scribe for Dr. Cote. This note accurately reflects the work and decisions made by Dr. Cote.     I, Dr. Cote, attest all medical record entries made by the scribe were under my direction and were personally dictated by me. I have reviewed the chart and agree that the record accurately reflects my performance of the history, physical exam, and assessment and plan.           [1]   Past Medical History:  Diagnosis Date    Abnormality of albumin 12/09/2014    Abnormal albumin    Acute bronchitis due to infection 06/20/2024    Encounter for general adult medical examination without abnormal findings 10/04/2019    Encounter for preventive health examination    Encounter for immunization 10/08/2015    Need for immunization against influenza    Encounter for screening for other viral diseases 05/02/2017    Need for hepatitis C screening test     Enteroviral vesicular stomatitis with exanthem 07/29/2014    Hand, foot and mouth disease    Enterovirus infection, unspecified 07/29/2014    Coxsackie viruses    Other microscopic hematuria 01/23/2018    Microscopic hematuria    Personal history of other diseases of the female genital tract 01/23/2018    History of vaginal discharge    Personal history of other diseases of urinary system 01/23/2018    History of prolapse of bladder    Personal history of other infectious and parasitic diseases 07/29/2014    History of viral exanthem    Personal history of other infectious and parasitic diseases 08/19/2014    History of athlete's foot    Personal history of other specified conditions 02/24/2014    History of urinary frequency    Personal history of other specified conditions 02/02/2018    History of dysuria    Personal history of other specified conditions 04/30/2015    History of vertigo    Personal history of urinary (tract) infections 02/24/2014    History of urinary tract infection    Rash and other nonspecific skin eruption 09/03/2020    Skin rash    Urinary tract infection, site not specified 01/04/2018    Acute UTI    Urinary tract infection, site not specified 05/26/2016    Acute UTI   [2]   Past Surgical History:  Procedure Laterality Date    BLADDER SURGERY  04/30/2015    Bladder Surgery    HYSTERECTOMY  04/30/2015    Hysterectomy    OTHER SURGICAL HISTORY  07/10/2020    Cataract surgery   [3]   Social History  Tobacco Use    Smoking status: Never    Smokeless tobacco: Never   Vaping Use    Vaping status: Never Used   Substance Use Topics    Alcohol use: Never    Drug use: Never   [4]   Family History  Problem Relation Name Age of Onset    Diabetes Mother      Other (Hepatic cirrhosis) Father     [5]   Allergies  Allergen Reactions    Penicillins Other     Unable to talk for a week

## 2025-07-11 DIAGNOSIS — I10 ESSENTIAL (PRIMARY) HYPERTENSION: ICD-10-CM

## 2025-07-11 DIAGNOSIS — E11.9 TYPE 2 DIABETES MELLITUS WITHOUT COMPLICATIONS: ICD-10-CM

## 2025-07-14 RX ORDER — ENALAPRIL MALEATE 20 MG/1
20 TABLET ORAL DAILY
Qty: 90 TABLET | Refills: 0 | Status: SHIPPED | OUTPATIENT
Start: 2025-07-14

## 2025-07-14 RX ORDER — AMLODIPINE BESYLATE 5 MG/1
5 TABLET ORAL DAILY
Qty: 90 TABLET | Refills: 0 | Status: SHIPPED | OUTPATIENT
Start: 2025-07-14

## 2025-07-14 RX ORDER — GLIMEPIRIDE 2 MG/1
2 TABLET ORAL 2 TIMES DAILY
Qty: 180 TABLET | Refills: 0 | Status: SHIPPED | OUTPATIENT
Start: 2025-07-14

## 2025-07-14 RX ORDER — METFORMIN HYDROCHLORIDE 1000 MG/1
TABLET ORAL
Qty: 180 TABLET | Refills: 0 | Status: SHIPPED | OUTPATIENT
Start: 2025-07-14

## 2025-07-19 LAB
25(OH)D3+25(OH)D2 SERPL-MCNC: 44 NG/ML (ref 30–100)
ALBUMIN SERPL-MCNC: 4.3 G/DL (ref 3.6–5.1)
ALBUMIN/CREAT UR: 10 MG/G CREAT
ALP SERPL-CCNC: 33 U/L (ref 37–153)
ALT SERPL-CCNC: 18 U/L (ref 6–29)
ANION GAP SERPL CALCULATED.4IONS-SCNC: 10 MMOL/L (CALC) (ref 7–17)
AST SERPL-CCNC: 19 U/L (ref 10–35)
BASOPHILS # BLD AUTO: 59 CELLS/UL (ref 0–200)
BASOPHILS NFR BLD AUTO: 0.9 %
BILIRUB SERPL-MCNC: 0.4 MG/DL (ref 0.2–1.2)
BUN SERPL-MCNC: 11 MG/DL (ref 7–25)
CALCIUM SERPL-MCNC: 9.1 MG/DL (ref 8.6–10.4)
CHLORIDE SERPL-SCNC: 107 MMOL/L (ref 98–110)
CHOLEST SERPL-MCNC: 210 MG/DL
CHOLEST/HDLC SERPL: 3.3 (CALC)
CO2 SERPL-SCNC: 24 MMOL/L (ref 20–32)
CREAT SERPL-MCNC: 0.8 MG/DL (ref 0.6–1)
CREAT UR-MCNC: 220 MG/DL (ref 20–275)
EGFRCR SERPLBLD CKD-EPI 2021: 76 ML/MIN/1.73M2
EOSINOPHIL # BLD AUTO: 436 CELLS/UL (ref 15–500)
EOSINOPHIL NFR BLD AUTO: 6.7 %
ERYTHROCYTE [DISTWIDTH] IN BLOOD BY AUTOMATED COUNT: 12.7 % (ref 11–15)
GLUCOSE SERPL-MCNC: 189 MG/DL (ref 65–99)
HCT VFR BLD AUTO: 42.3 % (ref 35–45)
HDLC SERPL-MCNC: 63 MG/DL
HGB BLD-MCNC: 13.8 G/DL (ref 11.7–15.5)
LDLC SERPL CALC-MCNC: 122 MG/DL (CALC)
LYMPHOCYTES # BLD AUTO: 3055 CELLS/UL (ref 850–3900)
LYMPHOCYTES NFR BLD AUTO: 47 %
MCH RBC QN AUTO: 29.4 PG (ref 27–33)
MCHC RBC AUTO-ENTMCNC: 32.6 G/DL (ref 32–36)
MCV RBC AUTO: 90 FL (ref 80–100)
MICROALBUMIN UR-MCNC: 2.3 MG/DL
MONOCYTES # BLD AUTO: 416 CELLS/UL (ref 200–950)
MONOCYTES NFR BLD AUTO: 6.4 %
NEUTROPHILS # BLD AUTO: 2535 CELLS/UL (ref 1500–7800)
NEUTROPHILS NFR BLD AUTO: 39 %
NONHDLC SERPL-MCNC: 147 MG/DL (CALC)
PLATELET # BLD AUTO: 337 THOUSAND/UL (ref 140–400)
PMV BLD REES-ECKER: 9.8 FL (ref 7.5–12.5)
POTASSIUM SERPL-SCNC: 4 MMOL/L (ref 3.5–5.3)
PROT SERPL-MCNC: 6.9 G/DL (ref 6.1–8.1)
RBC # BLD AUTO: 4.7 MILLION/UL (ref 3.8–5.1)
SODIUM SERPL-SCNC: 141 MMOL/L (ref 135–146)
TRIGL SERPL-MCNC: 134 MG/DL
TSH SERPL-ACNC: 2.47 MIU/L (ref 0.4–4.5)
VIT B12 SERPL-MCNC: 458 PG/ML (ref 200–1100)
WBC # BLD AUTO: 6.5 THOUSAND/UL (ref 3.8–10.8)

## 2025-07-25 ENCOUNTER — OFFICE VISIT (OUTPATIENT)
Dept: URGENT CARE | Facility: URGENT CARE | Age: 75
End: 2025-07-25
Payer: MEDICARE

## 2025-07-25 VITALS
RESPIRATION RATE: 18 BRPM | SYSTOLIC BLOOD PRESSURE: 155 MMHG | TEMPERATURE: 98.2 F | HEART RATE: 78 BPM | OXYGEN SATURATION: 95 % | WEIGHT: 147.05 LBS | DIASTOLIC BLOOD PRESSURE: 90 MMHG | BODY MASS INDEX: 28.72 KG/M2

## 2025-07-25 DIAGNOSIS — T63.441A ACCIDENTAL BEE STING: Primary | ICD-10-CM

## 2025-07-25 DIAGNOSIS — L03.113 CELLULITIS OF RIGHT UPPER EXTREMITY: ICD-10-CM

## 2025-07-25 RX ORDER — METHYLPREDNISOLONE 4 MG/1
TABLET ORAL
Qty: 21 TABLET | Refills: 0 | Status: SHIPPED | OUTPATIENT
Start: 2025-07-25 | End: 2025-07-31

## 2025-07-25 RX ORDER — CEPHALEXIN 500 MG/1
500 CAPSULE ORAL 3 TIMES DAILY
Qty: 21 CAPSULE | Refills: 0 | Status: SHIPPED | OUTPATIENT
Start: 2025-07-25 | End: 2025-08-01

## 2025-07-25 NOTE — PROGRESS NOTES
Subjective   Patient ID: Lynn Foote is a 75 y.o. female with T2DM and HTN present today with a chief complaint of Insect Bite (Bee stings 2 days ago to both hands, chin, left shoulder, itchy, blistering now).    History of Present Illness  HPI  Pt tried cortisone 10 cream and ice compresses with no relief. Pt was landscaping in yard and bees were in the wood and stung her on face, hands and left shoulder. She denies difficulty breathing or vomiting or fever. She can not sleep due to itching.   Pt has PCN allergy causes her to lose her voice.     T2DM, on metformin and glimepiride. A1C 7.9%.  HTN, on amlopidine and bisprolol    Past Medical History  Allergies as of 07/25/2025 - Reviewed 07/25/2025   Allergen Reaction Noted    Penicillins Other 03/18/2023       Prescriptions Prior to Admission[1]     Medical History[2]    Surgical History[3]     reports that she has never smoked. She has never used smokeless tobacco. She reports that she does not drink alcohol and does not use drugs.    Review of Systems  Review of Systems                               Objective    Vitals:    07/25/25 1636   BP: 155/90   Pulse: 78   Resp: 18   Temp: 36.8 °C (98.2 °F)   TempSrc: Oral   SpO2: 95%   Weight: 66.7 kg (147 lb 0.8 oz)     No LMP recorded. Patient is postmenopausal.    Physical Exam  Constitutional:       Appearance: Normal appearance. She is obese.   HENT:      Nose: Nose normal.      Mouth/Throat:      Mouth: Mucous membranes are moist.      Comments: No lip swelling    Eyes:      Conjunctiva/sclera: Conjunctivae normal.       Cardiovascular:      Rate and Rhythm: Normal rate and regular rhythm.      Heart sounds: No murmur heard.  Pulmonary:      Effort: Pulmonary effort is normal.      Breath sounds: Normal breath sounds. No wheezing.     Musculoskeletal:         General: Normal range of motion.      Cervical back: Normal range of motion.     Skin:     General: Skin is warm.      Comments: Erythematous hot to touch  patches on right forearm, left wrist and dorsal hand, small erythematous papule on left upper shoulder and chin.     Neurological:      General: No focal deficit present.      Mental Status: She is alert.     Psychiatric:         Mood and Affect: Mood normal.         Procedures    Point of Care Test & Imaging Results from this visit  No results found for this visit on 07/25/25.   Imaging  No results found.    Cardiology, Vascular, and Other Imaging  No other imaging results found for the past 2 days      Diagnostic study results (if any) were reviewed by Amina Cedillo PA-C.    Assessment/Plan   Allergies, medications, history, and pertinent labs/EKGs/Imaging reviewed by Amina Cedillo PA-C.     Medical Decision Making  75 y.o. female with T2DM and HTN present today with a chief complaint of Insect Bite (Bee stings 2 days ago to both hands, chin, left shoulder, itchy, blistering now).  Reviewed vitals elevated /90. Exam remarkable for well appearing, clear lungs, Erythematous hot to touch patches on right forearm, left wrist and dorsal hand, small erythematous papule on left upper shoulder and chin.    Discussed with pt treatment for Acute bee sting- Start Medrol dose irish, take with food to decrease upset stomach side effect. Avoid NSAIDS such as Ibuprofen, Aleve, Advil while on oral steroid; when taken together increase risk for GI bleed. Monitor BP while on oral steroid due to steroids retaining sodium and increasing BP. Stop steroid if BP >170/100.   Advised to start Zyrtec 10mg daily x 1 week. Topical OTC hydrocortisone cream 1-2 times a day x 3 days. Benadryl 25mg by mouth at bedtime to help with itching and cream 3 times a day as needed.   Apply cold compresses to area.   Look for bee nest and call  to remove.    Acute cellulitis- start Keflex 3 times a day x 5-7 days; take with food and probiotic to decrease GI upset.   GO to ER if spreading redness, hot to touch, feverish or  chest pain or difficulty breathing.     Orders and Diagnoses  Diagnoses and all orders for this visit:  Accidental bee sting  -     methylPREDNISolone (Medrol Dospak) 4 mg tablets; Follow schedule on package instructions  Cellulitis of right upper extremity  -     cephalexin (Keflex) 500 mg capsule; Take 1 capsule (500 mg) by mouth 3 times a day for 7 days.      Medical Admin Record      Patient disposition: Home    Electronically signed by Amina Cedillo PA-C  5:36 PM             [1] (Not in a hospital admission)   [2]   Past Medical History:  Diagnosis Date    Abnormality of albumin 12/09/2014    Abnormal albumin    Acute bronchitis due to infection 06/20/2024    Encounter for general adult medical examination without abnormal findings 10/04/2019    Encounter for preventive health examination    Encounter for immunization 10/08/2015    Need for immunization against influenza    Encounter for screening for other viral diseases 05/02/2017    Need for hepatitis C screening test    Enteroviral vesicular stomatitis with exanthem 07/29/2014    Hand, foot and mouth disease    Enterovirus infection, unspecified 07/29/2014    Coxsackie viruses    Other microscopic hematuria 01/23/2018    Microscopic hematuria    Personal history of other diseases of the female genital tract 01/23/2018    History of vaginal discharge    Personal history of other diseases of urinary system 01/23/2018    History of prolapse of bladder    Personal history of other infectious and parasitic diseases 07/29/2014    History of viral exanthem    Personal history of other infectious and parasitic diseases 08/19/2014    History of athlete's foot    Personal history of other specified conditions 02/24/2014    History of urinary frequency    Personal history of other specified conditions 02/02/2018    History of dysuria    Personal history of other specified conditions 04/30/2015    History of vertigo    Personal history of urinary (tract)  infections 02/24/2014    History of urinary tract infection    Rash and other nonspecific skin eruption 09/03/2020    Skin rash    Urinary tract infection, site not specified 01/04/2018    Acute UTI    Urinary tract infection, site not specified 05/26/2016    Acute UTI   [3]   Past Surgical History:  Procedure Laterality Date    BLADDER SURGERY  04/30/2015    Bladder Surgery    HYSTERECTOMY  04/30/2015    Hysterectomy    OTHER SURGICAL HISTORY  07/10/2020    Cataract surgery

## 2025-07-25 NOTE — PATIENT INSTRUCTIONS
Acute bee sting- Start Medrol dose irish, take with food to decrease upset stomach side effect. Avoid NSAIDS such as Ibuprofen, Aleve, Advil while on oral steroid; when taken together increase risk for GI bleed. Monitor BP and glucose while on oral steroid due to steroids retaining sodium and increasing BP. Stop steroid if BP >170/100.   Advised to start Zyrtec 10mg daily x 1 week. Topical OTC hydrocortisone cream 1-2 times a day x 3 days. Benadryl 25mg by mouth at bedtime to help with itching and cream 3 times a day as needed.   Apply cold compresses to area.   Look for bee nest and call  to remove.    Acute cellulitis- start Keflex 3 times a day x 5-7 days; take with food and probiotic to decrease GI upset.   GO to ER if spreading redness, hot to touch, feverish or chest pain or difficulty breathing.

## 2025-07-26 ENCOUNTER — TELEPHONE (OUTPATIENT)
Dept: URGENT CARE | Facility: URGENT CARE | Age: 75
End: 2025-07-26

## 2025-11-10 ENCOUNTER — APPOINTMENT (OUTPATIENT)
Dept: PRIMARY CARE | Facility: CLINIC | Age: 75
End: 2025-11-10
Payer: MEDICARE